# Patient Record
Sex: FEMALE | Race: WHITE | NOT HISPANIC OR LATINO | Employment: FULL TIME | URBAN - METROPOLITAN AREA
[De-identification: names, ages, dates, MRNs, and addresses within clinical notes are randomized per-mention and may not be internally consistent; named-entity substitution may affect disease eponyms.]

---

## 2017-01-05 ENCOUNTER — OFFICE VISIT - HEALTHEAST (OUTPATIENT)
Dept: FAMILY MEDICINE | Facility: CLINIC | Age: 57
End: 2017-01-05

## 2017-01-05 ENCOUNTER — COMMUNICATION - HEALTHEAST (OUTPATIENT)
Dept: TELEHEALTH | Facility: CLINIC | Age: 57
End: 2017-01-05

## 2017-01-05 DIAGNOSIS — J30.9 ALLERGIC RHINITIS: ICD-10-CM

## 2017-01-05 DIAGNOSIS — J01.90 ACUTE SINUSITIS, UNSPECIFIED: ICD-10-CM

## 2017-04-13 ENCOUNTER — OFFICE VISIT - HEALTHEAST (OUTPATIENT)
Dept: FAMILY MEDICINE | Facility: CLINIC | Age: 57
End: 2017-04-13

## 2017-04-13 DIAGNOSIS — R10.2 ADNEXAL PAIN: ICD-10-CM

## 2017-04-13 DIAGNOSIS — R10.9 ABDOMINAL PAIN: ICD-10-CM

## 2017-04-18 ENCOUNTER — COMMUNICATION - HEALTHEAST (OUTPATIENT)
Dept: FAMILY MEDICINE | Facility: CLINIC | Age: 57
End: 2017-04-18

## 2017-04-18 DIAGNOSIS — K03.2: ICD-10-CM

## 2017-06-16 ENCOUNTER — COMMUNICATION - HEALTHEAST (OUTPATIENT)
Dept: FAMILY MEDICINE | Facility: CLINIC | Age: 57
End: 2017-06-16

## 2018-08-01 ENCOUNTER — HOSPITAL ENCOUNTER (OUTPATIENT)
Dept: ULTRASOUND IMAGING | Facility: HOSPITAL | Age: 58
Discharge: HOME | End: 2018-08-01
Attending: FAMILY MEDICINE

## 2018-08-01 ENCOUNTER — OFFICE VISIT - HEALTHEAST (OUTPATIENT)
Dept: FAMILY MEDICINE | Facility: CLINIC | Age: 58
End: 2018-08-01

## 2018-08-01 ENCOUNTER — COMMUNICATION - HEALTHEAST (OUTPATIENT)
Dept: TELEHEALTH | Facility: CLINIC | Age: 58
End: 2018-08-01

## 2018-08-01 ENCOUNTER — RECORDS - HEALTHEAST (OUTPATIENT)
Dept: MAMMOGRAPHY | Facility: CLINIC | Age: 58
End: 2018-08-01

## 2018-08-01 DIAGNOSIS — S40.021S HEMATOMA OF ARM, RIGHT, SEQUELA: ICD-10-CM

## 2018-08-01 DIAGNOSIS — Z12.31 VISIT FOR SCREENING MAMMOGRAM: ICD-10-CM

## 2018-08-01 DIAGNOSIS — Z12.31 ENCOUNTER FOR SCREENING MAMMOGRAM FOR MALIGNANT NEOPLASM OF BREAST: ICD-10-CM

## 2018-10-26 ENCOUNTER — OFFICE VISIT - HEALTHEAST (OUTPATIENT)
Dept: FAMILY MEDICINE | Facility: CLINIC | Age: 58
End: 2018-10-26

## 2018-10-26 DIAGNOSIS — J00 ACUTE RHINITIS: ICD-10-CM

## 2018-10-26 DIAGNOSIS — Z00.00 HEALTHCARE MAINTENANCE: ICD-10-CM

## 2018-10-26 DIAGNOSIS — R20.0 NUMBNESS AND TINGLING: ICD-10-CM

## 2018-10-26 DIAGNOSIS — M79.10 MYALGIA: ICD-10-CM

## 2018-10-26 DIAGNOSIS — R20.2 NUMBNESS AND TINGLING: ICD-10-CM

## 2018-10-26 LAB
ALBUMIN UR-MCNC: NEGATIVE MG/DL
APPEARANCE UR: CLEAR
BILIRUB UR QL STRIP: NEGATIVE
COLOR UR AUTO: YELLOW
GLUCOSE UR STRIP-MCNC: NEGATIVE MG/DL
HGB UR QL STRIP: ABNORMAL
KETONES UR STRIP-MCNC: NEGATIVE MG/DL
LEUKOCYTE ESTERASE UR QL STRIP: NEGATIVE
NITRATE UR QL: NEGATIVE
PH UR STRIP: 5.5 [PH] (ref 5–8)
SP GR UR STRIP: 1.02 (ref 1–1.03)
UROBILINOGEN UR STRIP-ACNC: ABNORMAL

## 2019-01-25 ENCOUNTER — OFFICE VISIT - HEALTHEAST (OUTPATIENT)
Dept: FAMILY MEDICINE | Facility: CLINIC | Age: 59
End: 2019-01-25

## 2019-01-25 ENCOUNTER — COMMUNICATION - HEALTHEAST (OUTPATIENT)
Dept: SCHEDULING | Facility: CLINIC | Age: 59
End: 2019-01-25

## 2019-01-25 DIAGNOSIS — J30.89 NON-SEASONAL ALLERGIC RHINITIS DUE TO OTHER ALLERGIC TRIGGER: ICD-10-CM

## 2019-01-25 DIAGNOSIS — R42 VERTIGO: ICD-10-CM

## 2019-02-20 ENCOUNTER — OFFICE VISIT - HEALTHEAST (OUTPATIENT)
Dept: FAMILY MEDICINE | Facility: CLINIC | Age: 59
End: 2019-02-20

## 2019-02-20 ENCOUNTER — AMBULATORY - HEALTHEAST (OUTPATIENT)
Dept: ADMINISTRATIVE | Facility: CLINIC | Age: 59
End: 2019-02-20

## 2019-02-20 DIAGNOSIS — R42 DIZZINESS: ICD-10-CM

## 2019-02-20 DIAGNOSIS — Z00.00 ROUTINE GENERAL MEDICAL EXAMINATION AT A HEALTH CARE FACILITY: ICD-10-CM

## 2019-02-20 DIAGNOSIS — D50.9 IRON DEFICIENCY ANEMIA, UNSPECIFIED IRON DEFICIENCY ANEMIA TYPE: ICD-10-CM

## 2019-02-20 DIAGNOSIS — J30.89 NON-SEASONAL ALLERGIC RHINITIS DUE TO OTHER ALLERGIC TRIGGER: ICD-10-CM

## 2019-02-20 LAB
ALBUMIN SERPL-MCNC: 4.2 G/DL (ref 3.5–5)
ALP SERPL-CCNC: 101 U/L (ref 45–120)
ALT SERPL W P-5'-P-CCNC: 34 U/L (ref 0–45)
ANION GAP SERPL CALCULATED.3IONS-SCNC: 12 MMOL/L (ref 5–18)
AST SERPL W P-5'-P-CCNC: 27 U/L (ref 0–40)
BASOPHILS # BLD AUTO: 0.1 THOU/UL (ref 0–0.2)
BASOPHILS NFR BLD AUTO: 1 % (ref 0–2)
BILIRUB SERPL-MCNC: 1.3 MG/DL (ref 0–1)
BUN SERPL-MCNC: 12 MG/DL (ref 8–22)
CALCIUM SERPL-MCNC: 9.8 MG/DL (ref 8.5–10.5)
CHLORIDE BLD-SCNC: 103 MMOL/L (ref 98–107)
CHOLEST SERPL-MCNC: 238 MG/DL
CLUE CELLS: NORMAL
CO2 SERPL-SCNC: 26 MMOL/L (ref 22–31)
CREAT SERPL-MCNC: 1.06 MG/DL (ref 0.6–1.1)
EOSINOPHIL # BLD AUTO: 0.1 THOU/UL (ref 0–0.4)
EOSINOPHIL NFR BLD AUTO: 2 % (ref 0–6)
ERYTHROCYTE [DISTWIDTH] IN BLOOD BY AUTOMATED COUNT: 12.5 % (ref 11–14.5)
FASTING STATUS PATIENT QL REPORTED: YES
FERRITIN SERPL-MCNC: 72 NG/ML (ref 10–130)
GFR SERPL CREATININE-BSD FRML MDRD: 53 ML/MIN/1.73M2
GLUCOSE BLD-MCNC: 97 MG/DL (ref 70–125)
HCT VFR BLD AUTO: 43.1 % (ref 35–47)
HDLC SERPL-MCNC: 52 MG/DL
HGB BLD-MCNC: 14.4 G/DL (ref 12–16)
IRON SATN MFR SERPL: 30 % (ref 20–50)
IRON SERPL-MCNC: 100 UG/DL (ref 42–175)
LDLC SERPL CALC-MCNC: 154 MG/DL
LYMPHOCYTES # BLD AUTO: 1.2 THOU/UL (ref 0.8–4.4)
LYMPHOCYTES NFR BLD AUTO: 27 % (ref 20–40)
MCH RBC QN AUTO: 29.3 PG (ref 27–34)
MCHC RBC AUTO-ENTMCNC: 33.4 G/DL (ref 32–36)
MCV RBC AUTO: 88 FL (ref 80–100)
MONOCYTES # BLD AUTO: 0.4 THOU/UL (ref 0–0.9)
MONOCYTES NFR BLD AUTO: 8 % (ref 2–10)
NEUTROPHILS # BLD AUTO: 2.8 THOU/UL (ref 2–7.7)
NEUTROPHILS NFR BLD AUTO: 62 % (ref 50–70)
PLATELET # BLD AUTO: 267 THOU/UL (ref 140–440)
PMV BLD AUTO: 9.9 FL (ref 8.5–12.5)
POTASSIUM BLD-SCNC: 3.9 MMOL/L (ref 3.5–5)
PROT SERPL-MCNC: 7.9 G/DL (ref 6–8)
RBC # BLD AUTO: 4.92 MILL/UL (ref 3.8–5.4)
SODIUM SERPL-SCNC: 141 MMOL/L (ref 136–145)
TIBC SERPL-MCNC: 331 UG/DL (ref 313–563)
TRANSFERRIN SERPL-MCNC: 265 MG/DL (ref 212–360)
TRICHOMONAS, WET PREP: NORMAL
TRIGL SERPL-MCNC: 159 MG/DL
TSH SERPL DL<=0.005 MIU/L-ACNC: 2.13 UIU/ML (ref 0.3–5)
WBC: 4.6 THOU/UL (ref 4–11)
YEAST, WET PREP: NORMAL

## 2019-02-20 ASSESSMENT — MIFFLIN-ST. JEOR: SCORE: 1288.79

## 2019-02-21 LAB
HPV SOURCE: NORMAL
HUMAN PAPILLOMA VIRUS 16 DNA: NEGATIVE
HUMAN PAPILLOMA VIRUS 18 DNA: NEGATIVE
HUMAN PAPILLOMA VIRUS FINAL DIAGNOSIS: NORMAL
HUMAN PAPILLOMA VIRUS OTHER HR: NEGATIVE
SPECIMEN DESCRIPTION: NORMAL

## 2019-03-11 ENCOUNTER — COMMUNICATION - HEALTHEAST (OUTPATIENT)
Dept: FAMILY MEDICINE | Facility: CLINIC | Age: 59
End: 2019-03-11

## 2019-03-20 ENCOUNTER — RECORDS - HEALTHEAST (OUTPATIENT)
Dept: ADMINISTRATIVE | Facility: OTHER | Age: 59
End: 2019-03-20

## 2019-08-06 ENCOUNTER — COMMUNICATION - HEALTHEAST (OUTPATIENT)
Dept: FAMILY MEDICINE | Facility: CLINIC | Age: 59
End: 2019-08-06

## 2019-08-06 ENCOUNTER — HOSPITAL ENCOUNTER (OUTPATIENT)
Dept: MAMMOGRAPHY | Facility: CLINIC | Age: 59
Discharge: HOME | End: 2019-08-06
Attending: FAMILY MEDICINE

## 2019-08-06 DIAGNOSIS — Z12.31 VISIT FOR SCREENING MAMMOGRAM: ICD-10-CM

## 2019-10-28 ENCOUNTER — OFFICE VISIT - HEALTHEAST (OUTPATIENT)
Dept: FAMILY MEDICINE | Facility: CLINIC | Age: 59
End: 2019-10-28

## 2019-10-28 DIAGNOSIS — Z82.49 FAMILY HISTORY OF ABDOMINAL AORTIC ANEURYSM: ICD-10-CM

## 2019-10-28 DIAGNOSIS — Z23 NEED FOR INFLUENZA VACCINATION: ICD-10-CM

## 2019-10-28 ASSESSMENT — MIFFLIN-ST. JEOR: SCORE: 1287.26

## 2019-10-30 ENCOUNTER — HOSPITAL ENCOUNTER (OUTPATIENT)
Dept: ULTRASOUND IMAGING | Facility: HOSPITAL | Age: 59
Discharge: HOME | End: 2019-10-30
Attending: FAMILY MEDICINE

## 2019-10-30 ENCOUNTER — COMMUNICATION - HEALTHEAST (OUTPATIENT)
Dept: TELEHEALTH | Facility: CLINIC | Age: 59
End: 2019-10-30

## 2019-10-30 DIAGNOSIS — Z82.49 FAMILY HISTORY OF ABDOMINAL AORTIC ANEURYSM: ICD-10-CM

## 2019-11-01 ENCOUNTER — COMMUNICATION - HEALTHEAST (OUTPATIENT)
Dept: FAMILY MEDICINE | Facility: CLINIC | Age: 59
End: 2019-11-01

## 2019-11-01 ENCOUNTER — AMBULATORY - HEALTHEAST (OUTPATIENT)
Dept: FAMILY MEDICINE | Facility: CLINIC | Age: 59
End: 2019-11-01

## 2019-11-01 DIAGNOSIS — K80.20 CALCULUS OF GALLBLADDER WITHOUT CHOLECYSTITIS WITHOUT OBSTRUCTION: ICD-10-CM

## 2019-11-07 ENCOUNTER — SURGERY - HEALTHEAST (OUTPATIENT)
Dept: SURGERY | Facility: CLINIC | Age: 59
End: 2019-11-07

## 2019-11-07 ENCOUNTER — OFFICE VISIT - HEALTHEAST (OUTPATIENT)
Dept: SURGERY | Facility: CLINIC | Age: 59
End: 2019-11-07

## 2019-11-07 DIAGNOSIS — K80.20 CALCULUS OF GALLBLADDER WITHOUT CHOLECYSTITIS WITHOUT OBSTRUCTION: ICD-10-CM

## 2019-11-07 ASSESSMENT — MIFFLIN-ST. JEOR: SCORE: 1255.14

## 2019-11-13 ENCOUNTER — OFFICE VISIT - HEALTHEAST (OUTPATIENT)
Dept: FAMILY MEDICINE | Facility: CLINIC | Age: 59
End: 2019-11-13

## 2019-11-13 DIAGNOSIS — J01.01 ACUTE RECURRENT MAXILLARY SINUSITIS: ICD-10-CM

## 2019-11-13 ASSESSMENT — MIFFLIN-ST. JEOR: SCORE: 1273.28

## 2020-02-26 ENCOUNTER — COMMUNICATION - HEALTHEAST (OUTPATIENT)
Dept: FAMILY MEDICINE | Facility: CLINIC | Age: 60
End: 2020-02-26

## 2020-02-26 ENCOUNTER — OFFICE VISIT - HEALTHEAST (OUTPATIENT)
Dept: FAMILY MEDICINE | Facility: CLINIC | Age: 60
End: 2020-02-26

## 2020-02-26 DIAGNOSIS — Z11.4 SCREENING FOR HIV (HUMAN IMMUNODEFICIENCY VIRUS): ICD-10-CM

## 2020-02-26 DIAGNOSIS — Z00.00 ROUTINE GENERAL MEDICAL EXAMINATION AT A HEALTH CARE FACILITY: ICD-10-CM

## 2020-02-26 DIAGNOSIS — Z11.59 SCREENING FOR VIRAL DISEASE: ICD-10-CM

## 2020-02-26 DIAGNOSIS — Z23 IMMUNIZATION DUE: ICD-10-CM

## 2020-02-26 LAB
ALBUMIN SERPL-MCNC: 4.1 G/DL (ref 3.5–5)
ALP SERPL-CCNC: 107 U/L (ref 45–120)
ALT SERPL W P-5'-P-CCNC: 33 U/L (ref 0–45)
ANION GAP SERPL CALCULATED.3IONS-SCNC: 10 MMOL/L (ref 5–18)
AST SERPL W P-5'-P-CCNC: 27 U/L (ref 0–40)
BASOPHILS # BLD AUTO: 0 THOU/UL (ref 0–0.2)
BASOPHILS NFR BLD AUTO: 1 % (ref 0–2)
BILIRUB SERPL-MCNC: 1.5 MG/DL (ref 0–1)
BUN SERPL-MCNC: 14 MG/DL (ref 8–22)
CALCIUM SERPL-MCNC: 9.5 MG/DL (ref 8.5–10.5)
CHLORIDE BLD-SCNC: 105 MMOL/L (ref 98–107)
CHOLEST SERPL-MCNC: 245 MG/DL
CO2 SERPL-SCNC: 26 MMOL/L (ref 22–31)
CREAT SERPL-MCNC: 1.12 MG/DL (ref 0.6–1.1)
EOSINOPHIL # BLD AUTO: 0.2 THOU/UL (ref 0–0.4)
EOSINOPHIL NFR BLD AUTO: 5 % (ref 0–6)
ERYTHROCYTE [DISTWIDTH] IN BLOOD BY AUTOMATED COUNT: 12.3 % (ref 11–14.5)
FASTING STATUS PATIENT QL REPORTED: YES
GFR SERPL CREATININE-BSD FRML MDRD: 50 ML/MIN/1.73M2
GLUCOSE BLD-MCNC: 79 MG/DL (ref 70–125)
HCT VFR BLD AUTO: 41.2 % (ref 35–47)
HDLC SERPL-MCNC: 55 MG/DL
HGB BLD-MCNC: 13.8 G/DL (ref 12–16)
HIV 1+2 AB+HIV1 P24 AG SERPL QL IA: NEGATIVE
LDLC SERPL CALC-MCNC: 166 MG/DL
LYMPHOCYTES # BLD AUTO: 1.3 THOU/UL (ref 0.8–4.4)
LYMPHOCYTES NFR BLD AUTO: 34 % (ref 20–40)
MCH RBC QN AUTO: 29.8 PG (ref 27–34)
MCHC RBC AUTO-ENTMCNC: 33.4 G/DL (ref 32–36)
MCV RBC AUTO: 89 FL (ref 80–100)
MONOCYTES # BLD AUTO: 0.4 THOU/UL (ref 0–0.9)
MONOCYTES NFR BLD AUTO: 10 % (ref 2–10)
NEUTROPHILS # BLD AUTO: 2 THOU/UL (ref 2–7.7)
NEUTROPHILS NFR BLD AUTO: 51 % (ref 50–70)
PLATELET # BLD AUTO: 232 THOU/UL (ref 140–440)
PMV BLD AUTO: 7.2 FL (ref 7–10)
POTASSIUM BLD-SCNC: 4.2 MMOL/L (ref 3.5–5)
PROT SERPL-MCNC: 7.6 G/DL (ref 6–8)
RBC # BLD AUTO: 4.63 MILL/UL (ref 3.8–5.4)
SODIUM SERPL-SCNC: 141 MMOL/L (ref 136–145)
TRIGL SERPL-MCNC: 120 MG/DL
WBC: 3.9 THOU/UL (ref 4–11)

## 2020-02-26 ASSESSMENT — MIFFLIN-ST. JEOR: SCORE: 1286.64

## 2020-02-27 LAB — HCV AB SERPL QL IA: NEGATIVE

## 2020-03-02 ENCOUNTER — COMMUNICATION - HEALTHEAST (OUTPATIENT)
Dept: FAMILY MEDICINE | Facility: CLINIC | Age: 60
End: 2020-03-02

## 2020-04-16 ENCOUNTER — COMMUNICATION - HEALTHEAST (OUTPATIENT)
Dept: FAMILY MEDICINE | Facility: CLINIC | Age: 60
End: 2020-04-16

## 2020-04-16 DIAGNOSIS — J30.89 NON-SEASONAL ALLERGIC RHINITIS DUE TO OTHER ALLERGIC TRIGGER: ICD-10-CM

## 2020-07-23 ENCOUNTER — OFFICE VISIT - HEALTHEAST (OUTPATIENT)
Dept: FAMILY MEDICINE | Facility: CLINIC | Age: 60
End: 2020-07-23

## 2020-07-23 ENCOUNTER — COMMUNICATION - HEALTHEAST (OUTPATIENT)
Dept: SCHEDULING | Facility: CLINIC | Age: 60
End: 2020-07-23

## 2020-07-23 DIAGNOSIS — J01.01 ACUTE RECURRENT MAXILLARY SINUSITIS: ICD-10-CM

## 2020-07-23 DIAGNOSIS — M65.30 SNAPPING FINGER: ICD-10-CM

## 2020-09-22 ENCOUNTER — HOSPITAL ENCOUNTER (OUTPATIENT)
Dept: MAMMOGRAPHY | Facility: CLINIC | Age: 60
Discharge: HOME | End: 2020-09-22
Attending: FAMILY MEDICINE

## 2020-09-22 DIAGNOSIS — Z12.31 VISIT FOR SCREENING MAMMOGRAM: ICD-10-CM

## 2020-09-25 ENCOUNTER — COMMUNICATION - HEALTHEAST (OUTPATIENT)
Dept: FAMILY MEDICINE | Facility: CLINIC | Age: 60
End: 2020-09-25

## 2020-09-29 ENCOUNTER — COMMUNICATION - HEALTHEAST (OUTPATIENT)
Dept: FAMILY MEDICINE | Facility: CLINIC | Age: 60
End: 2020-09-29

## 2020-09-29 ENCOUNTER — COMMUNICATION - HEALTHEAST (OUTPATIENT)
Dept: SCHEDULING | Facility: CLINIC | Age: 60
End: 2020-09-29

## 2020-09-29 DIAGNOSIS — R79.89 ABNORMAL SERUM CREATININE LEVEL: ICD-10-CM

## 2020-10-10 ENCOUNTER — VIRTUAL VISIT (OUTPATIENT)
Dept: FAMILY MEDICINE | Facility: OTHER | Age: 60
End: 2020-10-10

## 2020-10-10 ENCOUNTER — COMMUNICATION - HEALTHEAST (OUTPATIENT)
Dept: SCHEDULING | Facility: CLINIC | Age: 60
End: 2020-10-10

## 2020-10-11 NOTE — PROGRESS NOTES
"Date: 10/10/2020 20:00:38  Clinician: Brittany Contreras  Clinician NPI: [id number]  Patient: Cristiana Shi  Patient : 1960  Patient Address: [address] [place] [address]  Patient Phone: [phone]  Visit Protocol: URI  Patient Summary:  Cristiana is a 60 year old ( : 1960 ) female who initiated a [facility] Visit for COVID-19 (Coronavirus) evaluation and screening. When asked the question \"Please sign me up to receive news, health information and promotions. \", Cristiana responded \"No\".    When asked when her symptoms started, Cristiana reported that she does not have any symptoms.   She denies taking antibiotic medication in the past month and having recent facial or sinus surgery in the past 60 days.    Pertinent COVID-19 (Coronavirus) information  In the past 14 days, Cristiana has not worked in a congregate living setting.   She does not work or volunteer as healthcare worker or a  and does not work or volunteer in a healthcare facility.   Cristiana also has not lived in a congregate living setting in the past 14 days. She does not live with a healthcare worker.   Cristiana has had a close contact with a laboratory-confirmed COVID-19 patient in the last 14 days. Additional information about contact with COVID-19 (Coronavirus) patient as reported by the patient (free text): Dakota Paul 10/09/2020 in his driveway while we were loading a car. We were less than 6 feet apart not wearing masks.   Patient reported they are not living in the same household with a COVID-19 positive patient.  Patient denies being in an enclosed space for greater than 15 minutes with a COVID-19 patient.  Since 2019, Cristiana and has not had upper respiratory infection or influenza-like illness. Has not been diagnosed with lab-confirmed COVID-19 test   Pertinent medical history  Cristiana does not get yeast infections when she takes antibiotics.   Cristiana does not need a return to work/school note.   Weight: 163 lbs   " Cristiana does not smoke or use smokeless tobacco.   Weight: 163 lbs    MEDICATIONS: loratadine oral, ALLERGIES: NKDA  Clinician Response:  Dear Cristiana,   Based on your exposure to COVID-19 (coronavirus), we would like to test you for this virus.  1. Please call [phone] to schedule your visit. Explain that you were referred by [facility] to have a COVID-19 test. Be ready to share your [facility] visit ID number.  The following will serve as your written order for this COVID Test, ordered by me, for the indication of suspected COVID [Z20.828]: The test will be ordered in [facility], our electronic health record, after you are scheduled. It will show as ordered and authorized by Cedrick Campbell MD.  Order: COVID-19 (coronavirus) PCR for ASYMPTOMATIC EXPOSURE testing from [facility].  If you know you have had close contact with someone who tested positive, you should be quarantined for 14 days after this exposure. You should stay in quarantine for the14 days even if the covid test is negative, the optimal time to test after exposure is 5-7 days from the exposure  Quarantine means   What should I do?  For safety, it's very important to follow these rules. Do this for 14 days after the date you were last exposed to the virus..  Stay home and away from others. Don't go to school or anywhere else. Generally quarantine means staying home from work but there are some exceptions to this. Please contact your workplace.   No hugging, kissing or shaking hands.  Don't let anyone visit.  Cover your mouth and nose with a mask, tissue or washcloth to avoid spreading germs.  Wash your hands and face often. Use soap and water.  What are the symptoms of COVID-19?  The most common symptoms are cough, fever and trouble breathing. Less common symptoms include headache, body aches, fatigue (feeling very tired), chills, sore throat, stuffy or runny nose, diarrhea (loose poop), loss of taste or smell, belly pain, and nausea or vomiting (feeling sick to your  stomach or throwing up).  After 14 days, if you have still don't have symptoms, you likely don't have this virus.  If you develop symptoms, follow these guidelines.  If you're normally healthy: Please start another [facility] visit to report your symptoms. Go to [email or web address].  If you have a serious health problem (like cancer, heart failure, an organ transplant or kidney disease): Call your specialty clinic. Let them know that you might have COVID-19.  2. When it's time for your COVID test:  Stay at least 6 feet away from others. (If someone will drive you to your test, stay in the backseat, as far away from the  as you can.)  Cover your mouth and nose with a mask, tissue or washcloth.  Go straight to the testing site. Don't make any stops on the way there or back.  Please note  Caregivers in these groups are at risk for severe illness due to COVID-19:  o People 65 years and older  o People who live in a nursing home or long-term care facility  o People with chronic disease (lung, heart, cancer, diabetes, kidney, liver, immunologic)  o People who have a weakened immune system, including those who:  Are in cancer treatment  Take medicine that weakens the immune system, such as corticosteroids  Had a bone marrow or organ transplant  Have an immune deficiency  Have poorly controlled HIV or AIDS  Are obese (body mass index of 40 or higher)  Smoke regularly  Where can I get more information?  [facility] -- About COVID-19: [email or web address]  CDC -- What to Do If You're Sick: www.cdc.gov/coronavirus/2019-ncov/about/steps-when-sick.html  CDC -- Ending Home Isolation: www.cdc.gov/coronavirus/2019-ncov/hcp/disposition-in-home-patients.html  CDC -- Caring for Someone: www.cdc.gov/coronavirus/2019-ncov/if-you-are-sick/care-for-someone.html  [facility] -- Interim Guidance for Hospital Discharge to Home: www.health.[facility].[place].us/diseases/coronavirus/hcp/hospdischarge.pdf  [facility] clinical trials  (COVID-19 research studies): clinicalaffairs.[facility]-clinical-trials  Below are the COVID-19 hotlines at the [place] Department of Health ([facility]). Interpreters are available.  For health questions: Call 128-218-5313 or 1-790.985.8470 (7 a.m. to 7 p.m.)  For questions about schools and childcare: Call 393-123-3223 or 1-173.716.4709 (7 a.m. to 7 p.m.)    COVID-19 (Coronavirus) General Information  Because there is currently no vaccine to prevent infection, the best way to protect yourself is to avoid being exposed to this virus. Common symptoms of COVID-19 include but are not limited to fever, cough, and shortness of breath. These symptoms appear 2-14 days after you are exposed to the virus that causes COVID-19. Click here for more information from the CDC on how to protect yourself.  If you are sick with COVID-19 or suspect you are infected with the virus that causes COVID-19, follow the steps here from the CDC to help prevent the disease from spreading to people in your home and community.  Click here for general information from the CDC on testing.  If you develop any of these emergency warning signs for COVID-19, get medical attention immediately:     Trouble breathing    Persistent pain or pressure in the chest    New confusion or inability to arouse    Bluish lips or face      Call your doctor or clinic before going in. Call [phone] if you have a medical emergency and notify the  you have or think you may have COVID-19.  For more detailed and up to date information on COVID-19 (Coronavirus), please visit the CDC website.   Diagnosis: Contact with and (suspected) exposure to other viral communicable diseases  Diagnosis ICD: Z20.828

## 2020-10-13 ENCOUNTER — AMBULATORY - HEALTHEAST (OUTPATIENT)
Dept: FAMILY MEDICINE | Facility: CLINIC | Age: 60
End: 2020-10-13

## 2020-10-13 DIAGNOSIS — Z20.822 SUSPECTED 2019 NOVEL CORONAVIRUS INFECTION: ICD-10-CM

## 2020-10-14 ENCOUNTER — AMBULATORY - HEALTHEAST (OUTPATIENT)
Dept: FAMILY MEDICINE | Facility: CLINIC | Age: 60
End: 2020-10-14

## 2020-10-14 DIAGNOSIS — Z20.822 SUSPECTED 2019 NOVEL CORONAVIRUS INFECTION: ICD-10-CM

## 2020-10-16 ENCOUNTER — COMMUNICATION - HEALTHEAST (OUTPATIENT)
Dept: SCHEDULING | Facility: CLINIC | Age: 60
End: 2020-10-16

## 2021-05-13 ENCOUNTER — RECORDS - HEALTHEAST (OUTPATIENT)
Dept: ADMINISTRATIVE | Facility: OTHER | Age: 61
End: 2021-05-13

## 2021-05-14 ENCOUNTER — RECORDS - HEALTHEAST (OUTPATIENT)
Dept: ADMINISTRATIVE | Facility: OTHER | Age: 61
End: 2021-05-14

## 2021-05-14 ENCOUNTER — OFFICE VISIT - HEALTHEAST (OUTPATIENT)
Dept: FAMILY MEDICINE | Facility: CLINIC | Age: 61
End: 2021-05-14

## 2021-05-14 DIAGNOSIS — Z00.00 ROUTINE GENERAL MEDICAL EXAMINATION AT A HEALTH CARE FACILITY: ICD-10-CM

## 2021-05-14 DIAGNOSIS — N18.31 STAGE 3A CHRONIC KIDNEY DISEASE (H): ICD-10-CM

## 2021-05-14 DIAGNOSIS — R23.3 EASY BRUISING: ICD-10-CM

## 2021-05-14 DIAGNOSIS — E55.9 VITAMIN D DEFICIENCY: ICD-10-CM

## 2021-05-14 DIAGNOSIS — J30.89 NON-SEASONAL ALLERGIC RHINITIS DUE TO OTHER ALLERGIC TRIGGER: ICD-10-CM

## 2021-05-14 LAB
ALBUMIN SERPL-MCNC: 4.2 G/DL (ref 3.5–5)
ALP SERPL-CCNC: 103 U/L (ref 45–120)
ALT SERPL W P-5'-P-CCNC: 36 U/L (ref 0–45)
ANION GAP SERPL CALCULATED.3IONS-SCNC: 11 MMOL/L (ref 5–18)
AST SERPL W P-5'-P-CCNC: 23 U/L (ref 0–40)
BILIRUB SERPL-MCNC: 1.4 MG/DL (ref 0–1)
BUN SERPL-MCNC: 15 MG/DL (ref 8–22)
CALCIUM SERPL-MCNC: 9.3 MG/DL (ref 8.5–10.5)
CHLORIDE BLD-SCNC: 104 MMOL/L (ref 98–107)
CHOLEST SERPL-MCNC: 232 MG/DL
CO2 SERPL-SCNC: 28 MMOL/L (ref 22–31)
CREAT SERPL-MCNC: 1.16 MG/DL (ref 0.6–1.1)
ERYTHROCYTE [DISTWIDTH] IN BLOOD BY AUTOMATED COUNT: 12.4 % (ref 11–14.5)
FASTING STATUS PATIENT QL REPORTED: NO
GFR SERPL CREATININE-BSD FRML MDRD: 47 ML/MIN/1.73M2
GLUCOSE BLD-MCNC: 89 MG/DL (ref 70–125)
HCT VFR BLD AUTO: 43.8 % (ref 35–47)
HDLC SERPL-MCNC: 49 MG/DL
HGB BLD-MCNC: 14.7 G/DL (ref 12–16)
LDLC SERPL CALC-MCNC: 154 MG/DL
MCH RBC QN AUTO: 29.9 PG (ref 27–34)
MCHC RBC AUTO-ENTMCNC: 33.6 G/DL (ref 32–36)
MCV RBC AUTO: 89 FL (ref 80–100)
PLATELET # BLD AUTO: 230 THOU/UL (ref 140–440)
PMV BLD AUTO: 8.7 FL (ref 7–10)
POTASSIUM BLD-SCNC: 4.4 MMOL/L (ref 3.5–5)
PROT SERPL-MCNC: 7.7 G/DL (ref 6–8)
RBC # BLD AUTO: 4.92 MILL/UL (ref 3.8–5.4)
SODIUM SERPL-SCNC: 143 MMOL/L (ref 136–145)
TRIGL SERPL-MCNC: 144 MG/DL
TSH SERPL DL<=0.005 MIU/L-ACNC: 1.04 UIU/ML (ref 0.3–5)
WBC: 5.1 THOU/UL (ref 4–11)

## 2021-05-14 RX ORDER — LORATADINE 10 MG/1
10 TABLET ORAL DAILY
Qty: 90 TABLET | Refills: 3 | Status: SHIPPED | OUTPATIENT
Start: 2021-05-14 | End: 2021-09-23

## 2021-05-14 RX ORDER — MOMETASONE FUROATE MONOHYDRATE 50 UG/1
2 SPRAY, METERED NASAL DAILY
Qty: 51 G | Refills: 4 | Status: SHIPPED | OUTPATIENT
Start: 2021-05-14 | End: 2022-09-28

## 2021-05-14 RX ORDER — PSEUDOEPHEDRINE HCL 30 MG
30 TABLET ORAL EVERY 6 HOURS PRN
Qty: 96 TABLET | Refills: 1 | Status: SHIPPED | OUTPATIENT
Start: 2021-05-14 | End: 2022-09-28

## 2021-05-14 ASSESSMENT — MIFFLIN-ST. JEOR: SCORE: 1245.82

## 2021-05-17 LAB — 25(OH)D3 SERPL-MCNC: 37.4 NG/ML (ref 30–80)

## 2021-05-19 ENCOUNTER — COMMUNICATION - HEALTHEAST (OUTPATIENT)
Dept: FAMILY MEDICINE | Facility: CLINIC | Age: 61
End: 2021-05-19

## 2021-05-21 ENCOUNTER — AMBULATORY - HEALTHEAST (OUTPATIENT)
Dept: FAMILY MEDICINE | Facility: CLINIC | Age: 61
End: 2021-05-21

## 2021-05-21 DIAGNOSIS — N18.31 STAGE 3A CHRONIC KIDNEY DISEASE (H): ICD-10-CM

## 2021-05-25 ENCOUNTER — RECORDS - HEALTHEAST (OUTPATIENT)
Dept: ADMINISTRATIVE | Facility: CLINIC | Age: 61
End: 2021-05-25

## 2021-05-27 ENCOUNTER — RECORDS - HEALTHEAST (OUTPATIENT)
Dept: ADMINISTRATIVE | Facility: CLINIC | Age: 61
End: 2021-05-27

## 2021-05-27 VITALS — RESPIRATION RATE: 16 BRPM | DIASTOLIC BLOOD PRESSURE: 78 MMHG | SYSTOLIC BLOOD PRESSURE: 115 MMHG | HEART RATE: 69 BPM

## 2021-05-27 VITALS — HEIGHT: 61 IN | WEIGHT: 165 LBS | BODY MASS INDEX: 31.18 KG/M2

## 2021-05-28 ENCOUNTER — RECORDS - HEALTHEAST (OUTPATIENT)
Dept: ADMINISTRATIVE | Facility: CLINIC | Age: 61
End: 2021-05-28

## 2021-05-30 VITALS — BODY MASS INDEX: 30.86 KG/M2 | WEIGHT: 166 LBS

## 2021-05-30 VITALS — WEIGHT: 169 LBS | BODY MASS INDEX: 31.42 KG/M2

## 2021-06-01 VITALS — BODY MASS INDEX: 31.6 KG/M2 | WEIGHT: 170 LBS

## 2021-06-02 ENCOUNTER — RECORDS - HEALTHEAST (OUTPATIENT)
Dept: ADMINISTRATIVE | Facility: CLINIC | Age: 61
End: 2021-06-02

## 2021-06-02 VITALS — WEIGHT: 175 LBS | BODY MASS INDEX: 32.53 KG/M2

## 2021-06-02 VITALS — HEIGHT: 61 IN | WEIGHT: 172.5 LBS | BODY MASS INDEX: 32.57 KG/M2

## 2021-06-02 VITALS — BODY MASS INDEX: 31.69 KG/M2 | WEIGHT: 170.5 LBS

## 2021-06-02 NOTE — PROGRESS NOTES
ASSESSMENT/PLAN:  1. Family history of abdominal aortic aneurysm  US Abdominal Aorta   2. Need for influenza vaccination  Influenza, Recombinant, Inj, Quadrivalent, PF, 18+YRS       This is a 60 yo female here for:  1.  Discussion regarding family history of abdominal aortic aneurysm:  Mom was hospitalized a year ago for surgical repair of leaking AAA.  Family members were instructed to be screened as well.  Patient's identical twin has been screened and is negative.  Patient is just finding out about this recommendation and is nervous about this.  We discussed this - I provided her with information about AAA.  Will get ultrasound of abdominal aorta - discuss actual risk once we have results.    2.  Due for seasonal influenza vaccination - ordered today.   Return in about 4 months (around 2/28/2020) for Annual physical.      There are no discontinued medications.  Patient Instructions       Chief Complaint:  Chief Complaint   Patient presents with     discuss family history of aortic aneurysm       HPI:   Cristiana Padilla is a 59 y.o. female c/o  Mom had leaking aortic aneurysm a year ago - had surgery and is okay  Children were told to get checked for aneurysm as well      PMH:   Patient Active Problem List    Diagnosis Date Noted     Calculus of gallbladder without cholecystitis without obstruction 11/01/2019     Iron deficiency anemia 02/20/2019     Obesity (BMI 30.0-34.9) 01/25/2019     Hyperplastic colon polyp 06/01/2016     Vertigo 12/07/2015     Past Medical History:   Diagnosis Date     Acute Sinusitis     Created by Conversion      Bruxism     Created by Conversion      Generalized Teeth Erosion     Created by Conversion      Menopause age 53     Tingling (Paresthesia)     Created by Conversion      Past Surgical History:   Procedure Laterality Date     WISDOM TOOTH EXTRACTION       Social History     Socioeconomic History     Marital status:      Spouse name: Not on file     Number of children: Not on  file     Years of education: Not on file     Highest education level: Not on file   Occupational History     Not on file   Social Needs     Financial resource strain: Not on file     Food insecurity:     Worry: Not on file     Inability: Not on file     Transportation needs:     Medical: Not on file     Non-medical: Not on file   Tobacco Use     Smoking status: Never Smoker     Smokeless tobacco: Never Used   Substance and Sexual Activity     Alcohol use: No     Drug use: No     Sexual activity: Yes     Partners: Male     Birth control/protection: Post-menopausal   Lifestyle     Physical activity:     Days per week: Not on file     Minutes per session: Not on file     Stress: Not on file   Relationships     Social connections:     Talks on phone: Not on file     Gets together: Not on file     Attends [employer] service: Not on file     Active member of club or organization: Not on file     Attends meetings of clubs or organizations: Not on file     Relationship status: Not on file     Intimate partner violence:     Fear of current or ex partner: Not on file     Emotionally abused: Not on file     Physically abused: Not on file     Forced sexual activity: Not on file   Other Topics Concern     Not on file   Social History Narrative    ; lives with      Family History   Problem Relation Age of Onset     Clotting disorder Father         had Waldenstroms macroglobulinemia     Colon cancer Mother 62     Aortic aneurysm Mother      Liver disease Sister 56     Alcohol abuse Maternal Grandfather        Meds:    Current Outpatient Medications:      loratadine (CLARITIN) 10 mg tablet, Take 1 tablet (10 mg total) by mouth daily., Disp: 90 tablet, Rfl: 4     mometasone (NASONEX) 50 mcg/actuation nasal spray, 2 sprays into each nostril daily., Disp: 51 g, Rfl: 4     pseudoephedrine (SUDAFED) 30 MG tablet, Take 1 tablet (30 mg total) by mouth every 6 (six) hours as needed for congestion., Disp: 96 tablet, Rfl:  "4    Allergies:  No Known Allergies    ROS:  Pertinent positives as noted in HPI; otherwise 12 point ROS negative.      Physical Exam:  EXAM:  /84 (Patient Site: Left Arm, Patient Position: Sitting, Cuff Size: Adult Large)   Pulse 70   Resp 16   Ht 5' 0.13\" (1.527 m)   Wt 176 lb 1.6 oz (79.9 kg)   BMI 34.25 kg/m     Gen:  NAD, appears well, well-hydrated  HEENT:  TMs nl, oropharynx benign, nasal mucosa nl, conjunctiva clear  Neck:  Supple, no adenopathy, no thyromegaly, no carotid bruits, no JVD  Lungs:  Clear to auscultation bilaterally  Cor:  RRR no murmur  Abd:  Soft, nontender, BS+, no masses (no pulsatile masses), no guarding or rebound, no HSM  Extr:  Neg.  Neuro:  No asymmetry  Skin:  Warm/dry        Results:    Us Abdominal Aorta    Result Date: 10/30/2019  EXAM: US ABDOMINAL AORTA LOCATION: [facility] DATE/TIME: 10/30/2019 8:16 AM INDICATION: family history rupture abdominal aortic aneurysm COMPARISON: None. TECHNIQUE: Transverse and longitudinal images of the aorta. Color flow and spectral Doppler with waveform analysis. FINDINGS: No abdominal aortic aneurysm.  There is very mild atheromatous plaque in the abdominal aorta. Incidental note of cholelithiasis. MEASUREMENTS: Proximal Aorta: 2.5 x 2.5 cm. Mid Aorta: 1.8 x 1.7 cm. Distal Aorta: 1.8 x 1.8 cm. Right Common Iliac Artery: 1.2 x 1.2 cm. Left Common Iliac Artery: 1.1 x 1.1 cm.     1.  No abdominal aortic aneurysm. 2.  Cholelithiasis.     "

## 2021-06-03 VITALS
WEIGHT: 169 LBS | RESPIRATION RATE: 16 BRPM | BODY MASS INDEX: 33.18 KG/M2 | HEIGHT: 60 IN | SYSTOLIC BLOOD PRESSURE: 118 MMHG | DIASTOLIC BLOOD PRESSURE: 84 MMHG | HEART RATE: 74 BPM | OXYGEN SATURATION: 98 %

## 2021-06-03 VITALS
BODY MASS INDEX: 34.57 KG/M2 | HEART RATE: 70 BPM | WEIGHT: 176.1 LBS | SYSTOLIC BLOOD PRESSURE: 118 MMHG | DIASTOLIC BLOOD PRESSURE: 84 MMHG | RESPIRATION RATE: 16 BRPM | HEIGHT: 60 IN

## 2021-06-03 NOTE — PROGRESS NOTES
ASSESSMENT/PLAN:    1. Acute recurrent maxillary sinusitis  We discussed that she may just have a viral upper respiratory infection on top of chronic allergic rhinitis, or could be sinus infection.  Onset is a bit vague to make a firm diagnosis.  I did write a prescription for azithromycin which she could take in a few days if her symptoms continue to be severe or increase in severity.  Otherwise, I would continue symptomatic care.  - azithromycin (ZITHROMAX Z-ELISA) 250 MG tablet; Take 2 tablets (500 mg) on  Day 1,  followed by 1 tablet (250 mg) once daily on Days 2 through 5.  Dispense: 6 tablet; Refill: 0       Return in about 3 months (around 2/13/2020) for Wellness Visit/Healthcare Maintainance Visit, with your primary care doctor.     SUBJECTIVE:  Cristiana Padilla is a 59 y.o. female here for sinus and throat congestion.  She usually goes to [facility], but is seen today at [facility].    She is a bit vague about her symptoms.  She always has some sinus congestion and takes Claritin, Sudafed, and Nasonex nasal spray for this.  This is been worse in the last 4 to 5 days, however.  Had a lot of pressure especially in her left maxillary sinus area about 4 days ago.  Additionally, she is had a sensation of something stuck in her throat that needs to be clear to make some sound when she is on the treadmill, got much worse in the last 3 days or so.    No fevers or chills.  Not a lot of sinus drainage.  Does have quite a bit of cough that is mostly nonproductive.  She wonders about the possibility of walking pneumonia.    She is a lifelong non-smoker.  No asthma or chronic respiratory issues.    ROS:  Remainder review of systems is negative unless previously stated    PHQ-2 Total Score: 0 (2/20/2019  8:00 AM)  PHQ-9 Total Score: 4 (2/20/2019  8:00 AM)       The following portions of the patient's history were reviewed and updated as appropriate: allergies, current medications and  "problem list  Current Outpatient Medications on File Prior to Visit   Medication Sig     loratadine (CLARITIN) 10 mg tablet Take 1 tablet (10 mg total) by mouth daily.     mometasone (NASONEX) 50 mcg/actuation nasal spray 2 sprays into each nostril daily.     pseudoephedrine (SUDAFED) 30 MG tablet Take 1 tablet (30 mg total) by mouth every 6 (six) hours as needed for congestion.     No current facility-administered medications on file prior to visit.         Social History     Tobacco Use   Smoking Status Never Smoker   Smokeless Tobacco Never Used       OBJECTIVE:  :  /86   Pulse 79   Temp 98.5  F (36.9  C) (Oral)   Resp 20   Ht 5' 0.13\" (1.527 m)   Wt 173 lb (78.5 kg)   SpO2 95%   BMI 33.64 kg/m    Wt Readings from Last 3 Encounters:   11/13/19 173 lb (78.5 kg)   11/07/19 169 lb (76.7 kg)   10/28/19 176 lb 1.6 oz (79.9 kg)         Gen:  A&A, NAD  HEENT: Bilateral ear canals clear, pearly gray tympanic membranes.  Oropharynx pink moist and benign.  Left nasal turbinate is moderately edematous and erythematous, right is slightly edematous.  Neck:  supple, no sig LAD or thyromegally  CV:  HRRR, no M/R/G  Resp:  CTAB  Ext:  W&D, no edema    "

## 2021-06-03 NOTE — PATIENT INSTRUCTIONS - HE
Patient Education     Understanding Acute Rhinosinusitis    Acute rhinosinusitis is when the lining of the inside of the nose and the sinuses becomes irritated and swollen. It is also called sinusitis, or a sinus infection.  Sinuses are air-filled spaces in the skull behind the face. They are kept moist and clean by a lining of mucosa. Things such as pollen, smoke, and chemical fumes can irritate the mucosa. It can then swell up. As a response to irritation, the mucosa makes more mucus and other fluids. Tiny hairlike cilia cover the mucosa. Cilia help carry mucus toward the opening of the sinus. Too much mucus may cause the cilia to stop working. This blocks the sinus opening. A buildup of fluid in the sinuses then causes pain and pressure. It can also cause bacteria to grow in the sinuses.  What causes acute rhinosinusitis?  A sinus infection is most often caused by a virus. You are more likely to get one after having a cold or the flu. In some cases, a sinus infection can be caused by bacteria.  You are at higher risk for a sinus infection if you:    Are older in age    Have structural problems with your sinuses    Smoke or are exposed to secondhand smoke    Are exposed to changes in pressure, such as from flying a lot or deep sea diving    Have asthma or allergies    Have a weak immune system    Have dental disease     Symptoms of acute rhinosinusitis  Symptoms of acute rhinosinusitis often last around 7 to 10 days. If you have a bacterial infection, they may last longer. They may also get better but then worsen. You may have:    Face pain or pressure under the eyes and around the nose    Headache    Fluid draining in the back of the throat (postnasal drip)    Congestion    Drainage that is thick and colored (often green), instead of clear    Cough    Problems with your sense of smell    Ear pain or hearing problems    Fever    Tooth pain    Fatigue  Diagnosing acute rhinosinusitis  Your healthcare provider  will ask about your symptoms and past health. He or she will look at your ears, nose, throat, and sinuses. Imaging tests, such as X-rays, are often not needed.  It can be hard to figure out if a sinus infection is caused by a virus or bacterium. A bacterial infection tends to last longer. Symptoms may also get better but then worsen. Your healthcare provider may take a sample of mucus from your nose to check for bacteria.  Treating acute rhinosinusitis  Most sinus infections will go away within 10 days. Your body will fight off the virus. If your symptoms seem to get better but then worsen, you may have a bacterial infection instead. Your healthcare provider will then give you antibiotics. Take this medicine until it is gone, even if you feel better.  To help ease your symptoms, your healthcare provider may advise:    Over-the-counter pain relievers. Medicines such as acetaminophen or ibuprofen can ease sinus pain. They may also lower a fever.    Nasal washes. Washing your nasal passages with salt water may ease pain and pressure. It can rinse out mucous and other irritants from your sinuses. Your healthcare provider can show you how to do it.    Nasal steroid spray. This prescription medicine can reduce inflammation in your sinuses.    Other medicines. Decongestants, antihistamines, and other nasal sprays may give short-term relief. They may help with congestion. Talk with your healthcare provider before taking these medicines.     Preventing acute rhinosinusitis  You can help prevent a sinus infection with these steps:    Wash your hands well and often.    Stay away from people who have a cold or upper respiratory infection.    Don't smoke. And stay away from secondhand smoke.    Use a humidifier at home.    Make sure you are up-to-date on your vaccines, such as the flu shot.     When to call your healthcare provider  Call your healthcare provider right away if you have any of these:    Fever of 100.4 F (38 C) or  higher, or as directed by your healthcare provider    Pain that gets worse    Symptoms that don t get better, or get worse    New symptoms  Date Last Reviewed: 6/1/2019 2000-2019 [employer] [address] [place] [address], [place] [address]. All rights reserved. This information is not intended as a substitute for professional medical care. Always follow your healthcare professional's instructions.

## 2021-06-03 NOTE — PROGRESS NOTES
HPI:   Cristiana Padilla is a 59 y.o. female referred to see me for cholelithiasis.  This was found incidentally when she was having and ultrasound of her aorta looking for an aneurysm due to family history.  When asked about abdominal pains, she states that she had 2 episodes back in June of sharp pain within the mid abdomen.  It seemed to radiate across the entire abdomen.  The pain was described as tight and it lasted about 20 minutes.  She does not recall any specific trigger.  She denies having any other symptoms along with it such as gassiness, bloating, nausea, or vomiting.  She does have some chronic underlying right subscapular pain.  Otherwise, she appears to be quite asymptomatic.  She denies any episodes of jaundice or acholic stools.      Allergies:  Patient has no known allergies.    Past medical history:  Sinusitis    Past surgical history:  [place] tooth extraction    Current Outpatient Medications:      loratadine (CLARITIN) 10 mg tablet, Take 1 tablet (10 mg total) by mouth daily., Disp: 90 tablet, Rfl: 4     mometasone (NASONEX) 50 mcg/actuation nasal spray, 2 sprays into each nostril daily., Disp: 51 g, Rfl: 4     pseudoephedrine (SUDAFED) 30 MG tablet, Take 1 tablet (30 mg total) by mouth every 6 (six) hours as needed for congestion., Disp: 96 tablet, Rfl: 4    Family history:  Father had a Waldenstrom's macroglobulinemia  Mother had colon cancer and abdominal aortic aneurysm    Social history:  Reports that she has never smoked. She has never used smokeless tobacco. She reports that she does not drink alcohol or use drugs.    Review of Systems:  General: No complaints or constitutional symptoms  Skin: No complaints or symptoms   Hematologic/Lymphatic: No symptoms or complaints  Psychiatric: No symptoms or complaints  Endocrine: No excessive fatigue, no hypermetabolic symptoms reported  Respiratory: No cough, shortness of breath, or wheezing  Cardiovascular: No chest pain or dyspnea on  "exertion  Gastrointestinal: As per HPI  Musculoskeletal: No recent injuries reported  Neurological: No focal neurologic defects reported.      EXAM:  /84   Pulse 74   Resp 16   Ht 5' 0.13\" (1.527 m)   Wt 169 lb (76.7 kg)   SpO2 98%   BMI 32.86 kg/m    Body mass index is 32.86 kg/m .  General: Alert, cooperative, appears stated age   Skin: Skin color, texture, turgor normal, no rashes or lesions   Lymphatic: No obvious adenopathy, no swelling   Eyes: No scleral icterus, pupils equal  HENT: No traumatic injury to the head or face, no gross abnormalities  Lungs: Normal respiratory effort, breath sounds equal bilaterally  Heart: Regular rate and rhythm  Abdomen: Soft, nondistended, nontender to palpation  Musculoskeletal: No obvious swelling or deformities  Neurologic: Grossly intact    LABS:  Lab Results   Component Value Date    WBC 4.6 02/20/2019    HGB 14.4 02/20/2019    HCT 43.1 02/20/2019    MCV 88 02/20/2019     02/20/2019         Lab Results   Component Value Date    ALT 34 02/20/2019    AST 27 02/20/2019    ALKPHOS 101 02/20/2019    BILITOT 1.3 (H) 02/20/2019       IMAGES:   Pertinent images personally reviewed by myself and discussed with the patient.  Radiology reports:  EXAM: US ABDOMINAL AORTA  LOCATION: [facility]  DATE/TIME: 10/30/2019 8:16 AM     INDICATION: family history rupture abdominal aortic aneurysm  COMPARISON: None.  TECHNIQUE: Transverse and longitudinal images of the aorta. Color flow and spectral Doppler with waveform analysis.     FINDINGS: No abdominal aortic aneurysm.  There is very mild atheromatous plaque in the abdominal aorta.     Incidental note of cholelithiasis.     MEASUREMENTS:  Proximal Aorta: 2.5 x 2.5 cm.  Mid Aorta: 1.8 x 1.7 cm.  Distal Aorta: 1.8 x 1.8 cm.  Right Common Iliac Artery: 1.2 x 1.2 cm.  Left Common Iliac Artery: 1.1 x 1.1 cm.     IMPRESSION:   1.  No abdominal aortic aneurysm.  2.  Cholelithiasis.    Assessment/Plan:   Cristiana Padilla " is a 59 y.o. female with incidental cholelithiasis seen on abdominal aortic ultrasound.  I have explained the pathophysiology of gallbladder disease in detail as well as the surgical versus non-operative management strategies.      The risks of surgery were discussed in detail which include, but are not limited to, bleeding, infection, injury to surrounding structures, the need to convert to an open procedure, blood clots, stroke, heart attack and death.  Specifically we discussed the risk of damage to the common bile duct and hepatic vessels, and the complications that may arise from damage to these structures.  Additionally, the risks of non operative management were discussed which include, but are not limited to, worsening infection, increased pain, sepsis and death.     Ultimately, her symptoms are atypical for biliary disease.  Her subscapular pain may be due to her stones.  Ultimately, we would not know if any of her symptoms were due to her gallbladder and last it was surgically removed and then she remained symptom-free.  It would be equally reasonable to monitor her conservatively and work on a well-balanced low-fat diet.  It would also be reasonable to pursue cholecystectomy to help prevent future gallstone attacks, cholecystitis, or cholangitis.  Her and her  would like to think about these options.  Preoperative orders were placed if she were to pursue cholecystectomy.  We would plan on it at the outpatient surgery center under general anesthesia.  Postoperative recovery and restrictions were discussed.  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  I would anticipate she would need a week off of work.  She will give us a call back if she has any further questions, concerns, or would like to schedule surgery.    Eneida Mon, DO   [facility]  [phone]

## 2021-06-04 VITALS
DIASTOLIC BLOOD PRESSURE: 86 MMHG | HEIGHT: 60 IN | OXYGEN SATURATION: 95 % | TEMPERATURE: 98.5 F | SYSTOLIC BLOOD PRESSURE: 128 MMHG | WEIGHT: 173 LBS | BODY MASS INDEX: 33.96 KG/M2 | RESPIRATION RATE: 20 BRPM | HEART RATE: 79 BPM

## 2021-06-06 NOTE — PROGRESS NOTES
Assessment:      Healthy female exam.    1. Routine general medical examination at a health care facility  Comprehensive Metabolic Panel    Lipid Profile    HM1(CBC and Differential)    HM1 (CBC with Diff)   2. Screening for HIV (human immunodeficiency virus)  HIV Antigen/Antibody Diagnostic [place]   3. Screening for viral disease  Hepatitis C Antibody (Anti-HCV)   4. Immunization due  Varicella Zoster, Recombinant Vaccine IM          Plan:      This is a 58 yo female here for physical exam:  1.  Health Maintenance - patient will have labs done; discussed recommendation for adult screening for HIV, hepatitis C - ordered; due for Shingrix  Vaccine - discussed /ordered.  2.  H/o iron deficiency - check hemogram     Subjective:      Cristiana Padilla is a 59 y.o. female who presents for an annual exam.  The patient reports that there is not domestic violence in her life.     Here for physical -   Still working - another 3-4 years    Will restart Claritin - is still taking nasal spray      Healthy Habits:   Regular Exercise: Yes and treadmill - exercise bike  Sunscreen Use: Yes  Healthy Diet: Yes  Dental Visits Regularly: Yes  Seat Belt: Yes  Sexually active: Yes  Self Breast Exam Monthly:Yes  Hemoccults: No  Flex Sig: No  Colonoscopy: 5/24/16        Immunization History   Administered Date(s) Administered     Hep A, historic 08/23/2001, 10/05/2010     INFLUENZA,RECOMBINANT,INJ,PF QUADRIVALENT 18+YRS 10/28/2019     Influenza, seasonal,quad inj 6-35 mos 09/26/2012     Influenza,seasonal quad, PF, =/> 6months 10/26/2018     Influenza,seasonal,quad inj =/> 6months 12/07/2015     Td, adult adsorbed, PF 10/26/2018     Tdap 10/24/2008     ZOSTER, RECOMBINANT, IM 02/26/2020     Immunization status: reviewed.    No exam data present    Gynecologic History  No LMP recorded. Patient is postmenopausal.  Contraception: post menopausal status  Last Pap: 2/20/19. Results were: normal  Last mammogram: 8/2019. Results were:  normal      OB History    Para Term  AB Living   5 4 4   1 4   SAB TAB Ectopic Multiple Live Births   1              # Outcome Date GA Lbr Chava/2nd Weight Sex Delivery Anes PTL Lv   5 Term            4 Term            3 Term            2 Term            1 SAB                Current Outpatient Medications   Medication Sig Dispense Refill     azithromycin (ZITHROMAX Z-ELISA) 250 MG tablet Take 2 tablets (500 mg) on  Day 1,  followed by 1 tablet (250 mg) once daily on Days 2 through 5. 6 tablet 0     loratadine (CLARITIN) 10 mg tablet Take 1 tablet (10 mg total) by mouth daily. 90 tablet 4     mometasone (NASONEX) 50 mcg/actuation nasal spray 2 sprays into each nostril daily. 51 g 4     pseudoephedrine (SUDAFED) 30 MG tablet Take 1 tablet (30 mg total) by mouth every 6 (six) hours as needed for congestion. 96 tablet 4     No current facility-administered medications for this visit.      Past Medical History:   Diagnosis Date     Acute Sinusitis     Created by Conversion      Bruxism     Created by Conversion      Generalized Teeth Erosion     Created by Conversion      Menopause age 53     Tingling (Paresthesia)     Created by Conversion      Past Surgical History:   Procedure Laterality Date     WISDOM TOOTH EXTRACTION       Patient has no known allergies.  Family History   Problem Relation Age of Onset     Clotting disorder Father         had Waldenstroms macroglobulinemia     Colon cancer Mother 62     Aortic aneurysm Mother      Liver disease Sister 56     Alcohol abuse Maternal Grandfather      Social History     Socioeconomic History     Marital status:      Spouse name: Not on file     Number of children: Not on file     Years of education: Not on file     Highest education level: Not on file   Occupational History     Not on file   Social Needs     Financial resource strain: Not on file     Food insecurity     Worry: Not on file     Inability: Not on file     Transportation needs     Medical:  "Not on file     Non-medical: Not on file   Tobacco Use     Smoking status: Never Smoker     Smokeless tobacco: Never Used   Substance and Sexual Activity     Alcohol use: No     Drug use: No     Sexual activity: Yes     Partners: Male     Birth control/protection: Post-menopausal   Lifestyle     Physical activity     Days per week: Not on file     Minutes per session: Not on file     Stress: Not on file   Relationships     Social connections     Talks on phone: Not on file     Gets together: Not on file     Attends Latter day service: Not on file     Active member of club or organization: Not on file     Attends meetings of clubs or organizations: Not on file     Relationship status: Not on file     Intimate partner violence     Fear of current or ex partner: Not on file     Emotionally abused: Not on file     Physically abused: Not on file     Forced sexual activity: Not on file   Other Topics Concern     Not on file   Social History Narrative    ; lives with        Review of Systems  Review of Systems      Pertinent positives as noted in HPI; otherwise 12 point ROS negative.      Objective:         Vitals:    02/26/20 0941   BP: 112/75   Pulse: 63   Resp: 14   Weight: 174 lb (78.9 kg)   Height: 5' 1\" (1.549 m)     Body mass index is 32.88 kg/m .    Physical  Physical Exam    EXAM:  /75 (Patient Site: Left Arm, Patient Position: Sitting, Cuff Size: Adult Large)   Pulse 63   Resp 14   Ht 5' 1\" (1.549 m)   Wt 174 lb (78.9 kg)   BMI 32.88 kg/m     Gen:  NAD, appears well, well-hydrated  HEENT:  TMs nl, oropharynx benign, nasal mucosa nl, conjunctiva clear  Neck:  Supple, no adenopathy, no thyromegaly, no carotid bruits, no JVD  Lungs:  Clear to auscultation bilaterally  Breast exam:  No breast lumps, no skin changes, no nipple discharge, no axillary adenopathy  Cor:  RRR no murmur  Abd:  Soft, nontender, BS+, no masses, no guarding or rebound, no HSM  Extr:  Neg.  Neuro:  No asymmetry, Nl motor " tone/strength, nl sensation, reflexes =, gait nl, nl coordination, CN intact,   Skin:  Warm/dry

## 2021-06-06 NOTE — TELEPHONE ENCOUNTER
Question following Office Visit  When did you see your provider: today   What is your question: Patient states she does not need HIV lab test requesting to cancel the order .   Okay to leave a detailed message: No

## 2021-06-07 NOTE — TELEPHONE ENCOUNTER
Refill Approved    Rx renewed per Medication Renewal Policy. Medication was last renewed on 2/20/19.    Angeli Gallo, [facility] Triage/Med Refill 4/17/2020     Requested Prescriptions   Pending Prescriptions Disp Refills     loratadine (CLARITIN) 10 mg tablet 90 tablet 4     Sig: Take 1 tablet (10 mg total) by mouth daily.       Antihistamine Refill Protocol Passed - 4/16/2020 11:05 AM        Passed - Patient has had office visit/physical in last year     Last office visit with prescriber/PCP: 10/28/2019 Gema Prajapati MD OR same dept: 10/28/2019 Gema Prajapati MD OR same specialty: 11/13/2019 Molly Beasley MD  Last physical: 2/26/2020 Last MTM visit: Visit date not found   Next visit within 3 mo: Visit date not found  Next physical within 3 mo: Visit date not found  Prescriber OR PCP: Gema Prajapati MD  Last diagnosis associated with med order: 1. Non-seasonal allergic rhinitis due to other allergic trigger  - loratadine (CLARITIN) 10 mg tablet; Take 1 tablet (10 mg total) by mouth daily.  Dispense: 90 tablet; Refill: 4    If protocol passes may refill for 12 months if within 3 months of last provider visit (or a total of 15 months).

## 2021-06-08 NOTE — PROGRESS NOTES
Sinus infection  Post nasal drip  Four days.   grandkids dog   No fever.  Teeth pain and frontal pain.   Ears plugged.    No sob  No chills    Allergic rhinitis doing well with medications controlling congestion      Very prn     Seven grandkids    ROS: as noted above    OBJECTIVE:   Vitals:    01/05/17 1021   BP: 100/72   Pulse: 76   Resp: 20   Temp: 97.7  F (36.5  C)      Head: atraumatic   Eyes: nl eom, anicteric   Ears: nl external ears     Mild frontal sinus tenderness    Neck: nl nodes, supple   Lungs: clear to ausc   Heart: regular rhythm  Back: no tenderness  Abd: soft nontender   Joints: uninflamed   Ext: nontender calves   Mental: euthymic  Neuro: no weakness  Gait: normal  ASSESSMENT/PLAN:    1. Acute Sinusitis  azithromycin (ZITHROMAX) 250 MG tablet   2. Allergic rhinitis  fluticasone (FLONASE) 50 mcg/actuation nasal spray     Anticipate resolution otherwise return.  Return sooner if symptoms worsen.  More than 10 of fifteen total minutes time spent education counseling regarding the issues and care of same as listed in the assessment and plan of this note

## 2021-06-09 NOTE — TELEPHONE ENCOUNTER
Two weeks of sinus pain and pressure. Using pseudophed and nasal spray without relief. Pain at 6.5 for headache, 4 for sinus pain. She has ear pain, like they are plugged. I connected with scheduling for a telephone appointment today with Dr. Lyons @ 2:40 p.m.  Celeste Travis RN  [facility]      Reason for Disposition    Earache    Additional Information    Negative: Sounds like a life-threatening emergency to the triager    Negative: Difficulty breathing, and not from stuffy nose (e.g., not relieved by cleaning out the nose)    Negative: SEVERE headache and has fever    Negative: Patient sounds very sick or weak to the triager    Negative: SEVERE sinus pain     Pain at 6.5    Negative: Severe headache     Pain at 6.5    Negative: Redness or swelling on the cheek, forehead, or around the eye    Negative: Fever > 103 F (39.4 C)    Negative: Fever > 101 F (38.3 C) and over 60 years of age    Negative: Fever > 100.0 F (37.8 C) and has diabetes mellitus or a weak immune system (e.g., HIV positive, cancer chemotherapy, organ transplant, splenectomy, chronic steroids)    Negative: Fever > 100.0 F (37.8 C) and bedridden (e.g., nursing home patient, stroke, chronic illness, recovering from surgery)    Negative: Fever present > 3 days (72 hours)    Negative: Fever returns after gone for over 24 hours and symptoms worse or not improved    Negative: Sinus pain (not just congestion) and fever    Protocols used: SINUS PAIN AND CONGESTION-A-[place]

## 2021-06-09 NOTE — PROGRESS NOTES
"Cristiana Padilla is a 60 y.o. female who is being evaluated via a billable telephone visit.      The patient has been notified of following:     \"This telephone visit will be conducted via a call between you and your physician/provider. We have found that certain health care needs can be provided without the need for a physical exam.  This service lets us provide the care you need with a short phone conversation.  If a prescription is necessary we can send it directly to your pharmacy.  If lab work is needed we can place an order for that and you can then stop by our lab to have the test done at a later time.    Telephone visits are billed at different rates depending on your insurance coverage. During this emergency period, for some insurers they may be billed the same as an in-person visit.  Please reach out to your insurance provider with any questions.    If during the course of the call the physician/provider feels a telephone visit is not appropriate, you will not be charged for this service.\"    Patient has given verbal consent to a Telephone visit? Yes    What phone number would you like to be contacted at? [phone]     Patient would like to receive their AVS by AVS Preference: Mail a copy.    Additional provider notes: Sinus\" nasal congestion, facial pressure, nasal drainage, for the past few days has been feeling more pressure in the upper part of her face above her eyes, has been stuffed up, every time she yawns she feels a pressure in the ears.  Has been taking loratadine, Nasonex with no significant improvement.  She was treated for sinusitis last fall, she took Z-Elisa and symptoms improved.  Has been on amoxicillin in the past, she took some many of that that is not helping anymore.  Also mentioned right hand finger clicking with movement, no injury.  No significant pain or discomfort.  Assessment/Plan:  1. Acute recurrent maxillary sinusitis  - azithromycin (ZITHROMAX Z-ELISA) 250 MG tablet; Take 2 " tablets (500 mg) on  Day 1,  followed by 1 tablet (250 mg) once daily on Days 2 through 5.  Dispense: 6 tablet; Refill: 0    2. Snapping finger    Acute sinusitis, we discussed treatment option, nasal washes etc., has taken amoxicillin at  a young age, does not seem to help for her anymore, has been on azithromycin with mild improvement, prescription was refilled, possible side effect discussed.  Right finger clicking, differential diagnosis discussed tendinitis osteoarthritis etc., she will follow-up with a clinic visit if she still not improving.    Phone call duration:  12 minutes    Yaneth Lyons MD

## 2021-06-10 NOTE — PROGRESS NOTES
Subjective:    Cristiana Padilla is a 56 y.o. female who presents for evaluation of 2 concerns.:  1.  Abdominal pain.  She has had off-and-on abdominal pain for over a week.  It is not improving.  She says every time she eats she gets sharp pains in her abdomen.  It is across the right and left sides.  Sometimes she does get diarrhea as well.  Diarrhea seems to be improving a little bit over the past few days.  She denies any nausea or vomiting.  No fevers.  No vaginal discharge.  No concerns for STDs.  She does not get pain when she drinks liquids, only when she eats foods.  It does not matter what type of food she eats.  No known sick contacts.  No one in the family with known H pylori infection.  Usually the pains can last up to 2 hours.  They do not radiate.  She took Rolaids last night and that did help somewhat.  No past history of significant GERD symptoms.  She does not recall any significant recent dietary changes.    2.  Left adnexal discomfort.  This is a little bit more recent than her abdominal pain.  Not necessarily connected.  She says she just feels a little pressure or discomfort in that area.  No specific pain.  She has never had anything like this before.  This is the lesser of her 2 concerns.    Patient Active Problem List   Diagnosis     Contusion With Intact Skin Surface Of The Left Ring Finger     Acute Sinusitis     Bruxism     Pica     Generalized Teeth Erosion     Tingling (Paresthesia)     Cerumen impaction     Vertigo     Hyperplastic colon polyp       Current Outpatient Prescriptions:      chlorhexidine (PERIDEX) 0.12 % solution, , Disp: , Rfl:      fluticasone (FLONASE) 50 mcg/actuation nasal spray, Instil 1 to 2 sprays each nostril every day, Disp: 16 g, Rfl: 12     omeprazole (PRILOSEC) 20 MG capsule, Take 1 capsule (20 mg total) by mouth 2 (two) times a day., Disp: 28 capsule, Rfl: 0     Objective:   Allergies:  Review of patient's allergies indicates no known  allergies.    Vitals:  Vitals:    04/13/17 1510   BP: 128/84   Pulse: 64   Resp: 16   Temp: 97  F (36.1  C)     Body mass index is 30.86 kg/(m^2).    Vital signs reviewed.  General: Patient is alert and oriented x 3, in no apparent distress  Cardiac: regular rate and rhythm, no murmurs  Pulmonary: lungs clear to auscultation bilaterally, no crackles, rales, rhonchi, or wheezing noted  Abdomen: Non tender to palpation, no hepatosplenomegaly, negative Middleton's sign, no pain over McBurney's point, positive bowel sounds, no masses palpable  Mild discomfort with palpation in the left adnexal area    Results for orders placed or performed in visit on 04/13/17   Hemoglobin   Result Value Ref Range    Hemoglobin 14.2 12.0 - 16.0 g/dL    other labs pending.    Assessment and Plan:   1.  Abdominal pain.  Differential includes viral gastroenteritis, GERD, gall bladder disease, H. pylori, stomach ulcer, nonspecific abdominal pain, versus other.  She does not have an acute abdomen today.  I discussed treatment options with patient today.  Labs were drawn and I will follow-up with those results.  Start on omeprazole twice daily for the next 1-2 weeks.  I reviewed reasons to go to the emergency room or call us over the weekend.  If all labs are normal, she can probably try the omeprazole for 2 weeks and see how she does.  We can also refer her to GI for follow-up as well.  She agrees with this plan.    2.  Left adnexal discomfort.  Not too bothersome today.  She has no concerns for STDs.  I reviewed possibility of this being related to her GI symptoms.  Also possibly could be ovarian cyst.  She will continue to monitor this.  I reviewed reasons to notify us of worsening symptoms.    This dictation uses voice recognition software, which may contain typographical errors.

## 2021-06-11 NOTE — TELEPHONE ENCOUNTER
Orders being requested: creatinine level serum due every six month per patient.  No order noted. Patient also is having iisue that her urine is colorless for days.  Transferred to triage for assessment then will go to scheduling to make lab appointment.   Reason service is needed/diagnosis: follow up  When are orders needed by: Today  Where to send Orders: in chart  Okay to leave detailed message?  No

## 2021-06-12 NOTE — TELEPHONE ENCOUNTER
Pt is calling.    She is wanting an appointment for a COVID test.  She had direct exposure to someone who tested positive for COVID-19 yesterday. She is currently asymptomatic. The other person had cold symptoms.   I advised her that she can do a virtual visit through [employer]. It is suggested to isolate at home for 14 days since the last day of exposure, which was yesterday. If able, wait to be tested for COVID for at least 48-72 hours as it takes a few days to show up. More likely to get a false positive if tested too soon.  Care advice reviewed and she verbalized understanding.    COVID 19 Nurse Triage Plan/Patient Instructions    Please be aware that novel coronavirus (COVID-19) may be circulating in the community. If you develop symptoms such as fever, cough, or SOB or if you have concerns about the presence of another infection including coronavirus (COVID-19), please contact your health care provider or visit [email or web address].     Disposition/Instructions    Virtual Visit with provider recommended. Reference Visit Selection Guide.    Thank you for taking steps to prevent the spread of this virus.  o Limit your contact with others.  o Wear a simple mask to cover your cough.  o Wash your hands well and often.    Resources    [facility]: About COVID-19: [email or web address]    CDC: What to Do If You're Sick: www.cdc.gov/coronavirus/2019-ncov/about/steps-when-sick.html    CDC: Ending Home Isolation: www.cdc.gov/coronavirus/2019-ncov/hcp/disposition-in-home-patients.html     CDC: Caring for Someone: www.cdc.gov/coronavirus/2019-ncov/if-you-are-sick/care-for-someone.html     [facility]: Interim Guidance for Hospital Discharge to Home: www.health.[facility].[place]/diseases/coronavirus/hcp/hospdischarge.pdf    [facility] clinical trials (COVID-19 research studies): clinicalaffairs.[facility]/umn-clinical-trials     Below are the COVID-19 hotlines at the [place] Department of Health ([facility]).  Interpreters are available.   o For health questions: Call 140-761-5783 or 1-951.700.1527 (7 a.m. to 7 p.m.)  o For questions about schools and childcare: Call 547-041-3492 or 1-384.759.5199 (7 a.m. to 7 p.m.)     Reason for Disposition    [1] COVID-19 EXPOSURE (Close Contact) AND [2] within last 14 days BUT [3] NO symptoms    Additional Information    Negative: COVID-19 has been diagnosed by a healthcare provider (HCP)    Negative: COVID-19 lab test positive    Negative: [1] Symptoms of COVID-19 (e.g., cough, fever, SOB, or others) AND [2] lives in an area with community spread    Negative: [1] Symptoms of COVID-19 (e.g., cough, fever, SOB, or others) AND [2] within 14 days of EXPOSURE (close contact) with diagnosed or suspected COVID-19 patient    Negative: [1] Symptoms of COVID-19 (e.g., cough, fever, SOB, or others) AND [2] within 14 days of travel from high-risk area for COVID-19 community spread (identified by CDC)    Negative: [1] Difficulty breathing (shortness of breath) occurs AND [2] onset > 14 days after COVID-19 EXPOSURE (Close Contact) AND [3] no community spread where patient lives    Negative: [1] Dry cough occurs AND [2] onset > 14 days after COVID-19 EXPOSURE AND [3] no community spread where patient lives    Negative: [1] Wet cough (i.e., white-yellow, yellow, green, or belkys colored sputum) AND [2] onset > 14 days after COVID-19 EXPOSURE AND [3] no community spread where patient lives    Negative: [1] Common cold symptoms AND [2] onset > 14 days after COVID-19 EXPOSURE AND [3] no community spread where patient lives    Negative: [1] COVID-19 EXPOSURE (Close Contact) within last 14 days AND [2] needs COVID-19 lab test to return to work AND [3] NO symptoms    Negative: [1] COVID-19 EXPOSURE (Close Contact) within last 14 days AND [2] exposed person is a healthcare worker who was NOT using all recommended personal protective equipment (i.e., a respirator-N95 mask, eye protection, gloves, and gown)  AND [3] NO symptoms    Protocols used: CORONAVIRUS (COVID-19) EXPOSURE-A- 8.4.20    Brittany Pate RN   [facility] Nurse Advisor  10/10/20 at 6:21 PM

## 2021-06-16 PROBLEM — K80.20 CALCULUS OF GALLBLADDER WITHOUT CHOLECYSTITIS WITHOUT OBSTRUCTION: Status: ACTIVE | Noted: 2019-11-01

## 2021-06-16 PROBLEM — E66.811 OBESITY (BMI 30.0-34.9): Status: ACTIVE | Noted: 2019-01-25

## 2021-06-16 PROBLEM — N18.31 STAGE 3A CHRONIC KIDNEY DISEASE (H): Status: ACTIVE | Noted: 2021-05-14

## 2021-06-16 PROBLEM — D50.9 IRON DEFICIENCY ANEMIA: Status: ACTIVE | Noted: 2019-02-20

## 2021-06-17 NOTE — TELEPHONE ENCOUNTER
Reason for Call:  Other   lans    Detailed comments: pt is calling because she said she has some questions regarding her recent labs    Phone Number Patient can be reached at: Home number on file [phone] (home)    Best Time: any    Can we leave a detailed message on this number?: Yes    Call taken on 5/19/2021 at 12:36 PM by Anny Jones

## 2021-06-17 NOTE — PATIENT INSTRUCTIONS - HE
Patient Instructions by Rafaela Heredia RT (R) at 10/28/2019  7:45 AM     Author: Rafaela Heredia RT (R) Service: -- Author Type: Radiologic Technologist    Filed: 10/28/2019  8:23 AM Encounter Date: 10/28/2019 Status: Signed    : Gema Prajapati MD (Physician)

## 2021-06-17 NOTE — PROGRESS NOTES
"Assessment:      Healthy female exam.      1. Routine general medical examination at a health care facility  Lipid [place] FASTING    Comprehensive Metabolic Panel    Thyroid Stimulating Hormone (TSH)   2. Non-seasonal allergic rhinitis due to other allergic trigger  loratadine (CLARITIN) 10 mg tablet    mometasone (NASONEX) 50 mcg/actuation nasal spray    pseudoephedrine (SUDAFED) 30 MG tablet   3. Stage 3a chronic kidney disease     4. Easy bruising  HM2(CBC w/o Differential)   5. Vitamin D deficiency  Vitamin D, Total (25-Hydroxy)       Plan:      This is a 61 yo female here for physical exam:  1.  Allergic rhinitis - some seasonal component but mostly chronic - uses loratadine, sudafed, nasonex nasal spray; generally controlled with prudent use of these medications  2.  Stage 3a chronic kidney disease - creatinine has been trending upward - recheck labs  3.  Easy bruising - no clear pattern to bruising; will check blood counts  4.  Vitamin D deficiency - check levels    Subjective:      Cristiana Padilla is a 60 y.o. female who presents for an annual exam.  The patient reports that there is not domestic violence in her life.     Staying healthy - no COVID  Had COVID vaccine -     7 grandchildren - between 4 and 7 yo    Discussed living will - \"It's on my list\"  Sister in law passed away - no will - ALS -     Healthy Habits:   Regular Exercise: Yes  Sunscreen Use: No  Healthy Diet: trying to eat better - no fried foods;  down several pounds  Dental Visits Regularly: Yes  Seat Belt: Yes  Sexually active: Yes  Self Breast Exam Monthly:not regularly  Hemoccults: No  Flex Sig: No  Colonoscopy: 5/13/2021, mild diverticulosis          Immunization History   Administered Date(s) Administered     COVID-19,PF,Pfizer 03/19/2021, 04/10/2021     Hep A, historic 08/23/2001, 10/05/2010     INFLUENZA,RECOMBINANT,INJ,PF QUADRIVALENT 18+YRS 10/28/2019, 10/03/2020     INFLUENZA,SEASONAL QUAD, PF, =/> 6months 10/26/2018     Influenza, " seasonal,quad inj 6-35 mos 2012     Influenza,seasonal,quad inj =/> 6months 2015     Td, adult adsorbed, PF 10/26/2018     Tdap 10/24/2008     ZOSTER, RECOMBINANT, IM 2020, 10/03/2020     Immunization status: reviewed.    No exam data present    Gynecologic History  No LMP recorded. Patient is postmenopausal.  Contraception: none  Last Pap: . Results were: normal  Last mammogram: 2020. Results were: normal      OB History    Para Term  AB Living   5 4 4   1 4   SAB TAB Ectopic Multiple Live Births   1              # Outcome Date GA Lbr Chava/2nd Weight Sex Delivery Anes PTL Lv   5 Term            4 Term            3 Term            2 Term            1 SAB                Current Outpatient Medications   Medication Sig Dispense Refill     loratadine (CLARITIN) 10 mg tablet Take 1 tablet (10 mg total) by mouth daily. 90 tablet 3     mometasone (NASONEX) 50 mcg/actuation nasal spray 2 sprays into each nostril daily. 51 g 4     pseudoephedrine (SUDAFED) 30 MG tablet Take 1 tablet (30 mg total) by mouth every 6 (six) hours as needed for congestion. 96 tablet 1     No current facility-administered medications for this visit.      Past Medical History:   Diagnosis Date     Acute Sinusitis     Created by Conversion      Bruxism     Created by Conversion      Generalized Teeth Erosion     Created by Conversion      Menopause age 53     Tingling (Paresthesia)     Created by Conversion      Past Surgical History:   Procedure Laterality Date     WISDOM TOOTH EXTRACTION       Cat dander, Dog dander, and Grass pollen  Family History   Problem Relation Age of Onset     Clotting disorder Father         had Waldenstroms macroglobulinemia     Colon cancer Mother 62     Aortic aneurysm Mother      Liver disease Sister 56     Alcohol abuse Maternal Grandfather      Social History     Socioeconomic History     Marital status:      Spouse name: Not on file     Number of children: Not on file  "    Years of education: Not on file     Highest education level: Not on file   Occupational History     Not on file   Social Needs     Financial resource strain: Not on file     Food insecurity     Worry: Not on file     Inability: Not on file     Transportation needs     Medical: Not on file     Non-medical: Not on file   Tobacco Use     Smoking status: Never Smoker     Smokeless tobacco: Never Used   Substance and Sexual Activity     Alcohol use: No     Drug use: No     Sexual activity: Yes     Partners: Male     Birth control/protection: Post-menopausal   Lifestyle     Physical activity     Days per week: Not on file     Minutes per session: Not on file     Stress: Not on file   Relationships     Social connections     Talks on phone: Not on file     Gets together: Not on file     Attends [employer] service: Not on file     Active member of club or organization: Not on file     Attends meetings of clubs or organizations: Not on file     Relationship status: Not on file     Intimate partner violence     Fear of current or ex partner: Not on file     Emotionally abused: Not on file     Physically abused: Not on file     Forced sexual activity: Not on file   Other Topics Concern     Not on file   Social History Narrative    ; lives with        Review of Systems  Review of Systems     Pertinent positives as noted in HPI; otherwise 12 point ROS negative.        Objective:         Vitals:    05/14/21 0728   BP: 115/78   Pulse: 69   Resp: 16   Weight: 165 lb (74.8 kg)   Height: 5' 1\" (1.549 m)     Body mass index is 31.18 kg/m .    Physical  Physical Exam     EXAM:  /78 (Patient Site: Left Arm, Patient Position: Sitting, Cuff Size: Adult Large)   Pulse 69   Resp 16   Ht 5' 1\" (1.549 m)   Wt 165 lb (74.8 kg)   BMI 31.18 kg/m     Gen:  NAD, appears well, well-hydrated  HEENT:  TMs nl, oropharynx benign, nasal mucosa nl, conjunctiva clear  Neck:  Supple, no adenopathy, no thyromegaly, no carotid " bruits, no JVD  Lungs:  Clear to auscultation bilaterally  Breast exam:  No breast lumps, no skin changes, no nipple discharge, no axillary adenopathy  Cor:  RRR no murmur  Abd:  Soft, nontender, BS+, no masses, no guarding or rebound, no HSM  Extr:  Neg., no swelling, no edema, no significant djd findings  Neuro:  No asymmetry, Nl motor tone/strength, nl sensation, reflexes =, gait nl, nl coordination, CN intact,   Skin:  Warm/dry

## 2021-06-18 NOTE — LETTER
Letter by Gema Prajapati MD at      Author: Gema Prajapati MD Service: -- Author Type: --    Filed:  Encounter Date: 3/11/2019 Status: (Other)       Cristiana Padilla  [address] [facility] [address]             March 11, 2019         Dear Ms. Padilla,    Below are the results from your recent visit:    Resulted Orders   CT Head With Contrast    Narrative    EXAM DATE:         03/01/2019    [facility] RADIOLOGY    EXAM: CT HEAD WITHOUT CONTRAST  LOCATION: [facility]  DATE/TIME: 03/01/2019, 10:00 AM    INDICATION: Dizziness and giddiness.  COMPARISON: None.  TECHNIQUE: Routine without IV contrast. Multiplanar reformats. Dose reduction  techniques were used.    FINDINGS:  INTRACRANIAL CONTENTS: No intracranial hemorrhage, extraaxial collection, or  mass effect.  No CT evidence of acute infarct. Small focus of white matter  hypoattenuation in the right frontal lobe is nonspecific, but likely reflecting  a tiny focus of chronic small vessel ischemic change. Otherwise, unremarkable  appearance to the brain parenchyma. Hypoattenuating arachnoid granulation at the  junction of the left transverse and sigmoid sinus is incidental. The ventricles  and sulci are normal for age.    VISUALIZED ORBITS/SINUSES/MASTOIDS: No significant orbital abnormality. No  significant paranasal sinus mucosal disease. No significant middle ear or  mastoid effusion.    OSSEOUS STRUCTURES/SOFT TISSUES: No significant abnormality.    CONCLUSION:  1.  Small focus of white matter hypoattenuation in the right frontal lobe is  nonspecific, but thought to reflect a small focus of chronic small vessel  ischemic change. Otherwise, unremarkable appearance to the brain parenchyma with  no CT findings for recent large vascular distribution infarct and no CT findings  to suggest an abnormal mass. No intracranial hemorrhage and no significant  intraparenchymal volume loss.    DICOM format image data is available to  "non-affiliated external healthcare  facilities or entities on a secure, media-free, reciprocally searchable basis  with patient authorization for at least a 12-month period after the study.                 Your head CT scan looks pretty good!   If you read the report, they describe one little spot of \"chronic small vessel change\" - these frequently come from high blood pressure - watch the blood pressures.  Otherwise, there would be nothing to do about this.  The rest is \"normal!\"    Please call with questions or contact us using [employer].    Sincerely,        Electronically signed by Gema Prajapati MD       "

## 2021-06-18 NOTE — LETTER
Letter by Gema Prajapati MD at      Author: Gema Prajapati MD Service: -- Author Type: --    Filed:  Encounter Date: 3/11/2019 Status: (Other)       Cristiana Padilla  [address] [facility] [address]             March 11, 2019         Dear MsArmida Padilla,    Below are the results from your recent visit:    Resulted Orders   Gynecologic Cytology (PAP Smear)   Result Value Ref Range    Case Report       Gynecologic Cytology Report                       Case: [id number]                                   Authorizing Provider:  Victorina,         Collected:           02/20/2019 0835                                     MD Gema                                                                 Ordering Location:     [facility]  Received:            02/20/2019 0836                                     Medicine/OB                                                                  First Screen:          Doreen Hall CT                                                                               (ASCP)                                                                       Specimen:    SUREPATH PAP, SCREENING, Endocervical/cervical                                             Interpretation  Negative for squamous intraepithelial lesion or malignancy.      Negative for squamous intraepithelial lesion or malignancy    Result Flag Normal Normal    Specimen Adequacy       Satisfactory for evaluation, endocervical/transformation zone component present    HPV Reflex? Yes regardless of result     HIGH RISK No     LMP/Menopause Date Postmenopausal     Abnormal Bleeding No     Pt Status Not Applicable     Birth Control/Hormones None     Previous Normal/Date 02/10/14     Prev Abn Date/Dx none     Cervical Appearance normal    Wet Prep, Vaginal   Result Value Ref Range    Yeast Result No yeast seen No yeast seen    Trichomonas No Trichomonas seen No Trichomonas seen    Clue Cells, Wet  Prep No Clue cells seen No Clue cells seen   Iron and Transferrin Iron Binding Capacity   Result Value Ref Range    Iron 100 42 - 175 ug/dL    Transferrin 265 212 - 360 mg/dL    Transferrin Saturation, Calculated 30 20 - 50 %    Transferrin IBC, Calculated 331 313 - 563 ug/dL   Ferritin   Result Value Ref Range    Ferritin 72 10 - 130 ng/mL   Comprehensive Metabolic Panel   Result Value Ref Range    Sodium 141 136 - 145 mmol/L    Potassium 3.9 3.5 - 5.0 mmol/L    Chloride 103 98 - 107 mmol/L    CO2 26 22 - 31 mmol/L    Anion Gap, Calculation 12 5 - 18 mmol/L    Glucose 97 70 - 125 mg/dL    BUN 12 8 - 22 mg/dL    Creatinine 1.06 0.60 - 1.10 mg/dL    GFR MDRD Af Amer >60 >60 mL/min/1.73m2    GFR MDRD Non Af Amer 53 (L) >60 mL/min/1.73m2    Bilirubin, Total 1.3 (H) 0.0 - 1.0 mg/dL    Calcium 9.8 8.5 - 10.5 mg/dL    Protein, Total 7.9 6.0 - 8.0 g/dL    Albumin 4.2 3.5 - 5.0 g/dL    Alkaline Phosphatase 101 45 - 120 U/L    AST 27 0 - 40 U/L    ALT 34 0 - 45 U/L    Narrative    Fasting Glucose reference range is 70-99 mg/dL per  American Diabetes Association (ADA) guidelines.   Lipid Profile   Result Value Ref Range    Triglycerides 159 (H) <=149 mg/dL    Cholesterol 238 (H) <=199 mg/dL    LDL Calculated 154 (H) <=129 mg/dL    HDL Cholesterol 52 >=50 mg/dL    Patient Fasting > 8hrs? Yes    Thyroid Stimulating Hormone (TSH)   Result Value Ref Range    TSH 2.13 0.30 - 5.00 uIU/mL   HM1 (CBC with Diff)   Result Value Ref Range    WBC 4.6 4.0 - 11.0 thou/uL    RBC 4.92 3.80 - 5.40 mill/uL    Hemoglobin 14.4 12.0 - 16.0 g/dL    Hematocrit 43.1 35.0 - 47.0 %    MCV 88 80 - 100 fL    MCH 29.3 27.0 - 34.0 pg    MCHC 33.4 32.0 - 36.0 g/dL    RDW 12.5 11.0 - 14.5 %    Platelets 267 140 - 440 thou/uL    MPV 9.9 8.5 - 12.5 fL    Neutrophils % 62 50 - 70 %    Lymphocytes % 27 20 - 40 %    Monocytes % 8 2 - 10 %    Eosinophils % 2 0 - 6 %    Basophils % 1 0 - 2 %    Neutrophils Absolute 2.8 2.0 - 7.7 thou/uL    Lymphocytes Absolute 1.2  0.8 - 4.4 thou/uL    Monocytes Absolute 0.4 0.0 - 0.9 thou/uL    Eosinophils Absolute 0.1 0.0 - 0.4 thou/uL    Basophils Absolute 0.1 0.0 - 0.2 thou/uL   HPV High Risk DNA Cervical   Result Value Ref Range    HPV Source SurePath     HPV16 DNA Negative NEG    HPV18 DNA Negative NEG    Other HR HPV Negative NEG    Final Diagnosis SEE NOTES       Comment:      This patient's sample is negative for HPV DNA.  This test was developed and its performance characteristics determined by the  [facility], [facility]. It  has not been cleared or approved by the FDA. The laboratory is regulated under  CLIA as qualified to perform high-complexity testing. This test is used for  clinical purposes. It should not be regarded as investigational or for  research.  (Note)  METHODOLOGY:  The Roche iron 4800 system uses automated extraction,  simultaneous amplification of HPV (L1 region) and beta-globin,  followed by  real time detection of fluorescent labeled HPV and beta  globin using specific oligonucleotide probes . The test specifically  identifies types HPV 16 DNA and HPV 18 DNA while concurrently  detecting the rest of the high risk types (31, 33, 35, 39, 45, 51,  52, 56, 58, 59, 66 or 68).    COMMENTS:  This test is not intended for use as a screening device  for women under age 30 with normal cervical   cytology.  Results should  be correlated with cytologic and histologic findings. Close clinical  followup is recommended.        Specimen Description Cervical Cells       Comment:      PERFORMED AT  [address] [address]        Labs are all stable - no sign of infection, normal pap smear.     Please call with questions or contact us using [employer].    Sincerely,        Electronically signed by Gema Prajapati MD

## 2021-06-19 NOTE — LETTER
Letter by Gema Prajapati MD at      Author: Gema Prajapati MD Service: -- Author Type: --    Filed:  Encounter Date: 8/6/2019 Status: (Other)         Cristiana Padilla  [address] [facility] [address]             August 6, 2019         Dear Ms. Padilla,    Below are the results from your recent visit:    Resulted Orders   Mammo Screening Bilateral    Narrative    BILATERAL FULL FIELD DIGITAL SCREENING MAMMOGRAM WITH TOMOSYNTHESIS    Performed on: 8/6/19.      Compared to: 08/01/2018 Mammo Screening Bilateral, 04/18/2016 Mammo   Screening Bilateral, and 08/15/2013 Mammo Screening Bilateral      Findings: The breasts have scattered areas of fibroglandular densities.   There is no radiographic evidence of malignancy. This study was evaluated   with the assistance of Computer-Aided Detection. Breast Tomosynthesis was   used in interpretation. Repeat routine screening mammogram in one year is   recommended.      ACR BI-RADS Category 1: Negative       Your mammogram is normal.     Please call with questions or contact us using [employer].    Sincerely,        Electronically signed by Gema Prajapati MD
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
## 2021-06-19 NOTE — LETTER
Letter by Gema Prajapati MD at      Author: Gema Prajapati MD Service: -- Author Type: --    Filed:  Encounter Date: 11/1/2019 Status: Signed         Cristiana Padilla  [address] [facility] [address]             November 1, 2019         Dear Ms. Padilla,    Below are the results from your recent visit:    Resulted Orders   US Abdominal Aorta    Narrative    EXAM: US ABDOMINAL AORTA  LOCATION: [facility]  DATE/TIME: 10/30/2019 8:16 AM    INDICATION: family history rupture abdominal aortic aneurysm  COMPARISON: None.  TECHNIQUE: Transverse and longitudinal images of the aorta. Color flow and spectral Doppler with waveform analysis.    FINDINGS: No abdominal aortic aneurysm.  There is very mild atheromatous plaque in the abdominal aorta.    Incidental note of cholelithiasis.    MEASUREMENTS:  Proximal Aorta: 2.5 x 2.5 cm.  Mid Aorta: 1.8 x 1.7 cm.  Distal Aorta: 1.8 x 1.8 cm.  Right Common Iliac Artery: 1.2 x 1.2 cm.  Left Common Iliac Artery: 1.1 x 1.1 cm.      Impression    1.  No abdominal aortic aneurysm.  2.  Cholelithiasis.           This is the results we discussed by phone.    Please call with questions or contact us using [employer].    Sincerely,        Electronically signed by Gema Prajapati MD

## 2021-06-20 NOTE — LETTER
Letter by Gema Prajapati MD at      Author: Gema Prajapati MD Service: -- Author Type: --    Filed:  Encounter Date: 3/2/2020 Status: (Other)         Cristiana Padilla  [address] [facility] [address]             March 2, 2020         Dear Ms. Padilla,    Below are the results from your recent visit:    Resulted Orders   Hepatitis C Antibody (Anti-HCV)   Result Value Ref Range    Hepatitis C Ab Negative Negative   HIV Antigen/Antibody Diagnostic Cascade   Result Value Ref Range    HIV Antigen / Antibody Negative Negative    Narrative    Method is Abbott HIV Ag/Ab for the detection of HIV p24 antigen, HIV-1 antibodies and HIV-2 antibodies.   Comprehensive Metabolic Panel   Result Value Ref Range    Sodium 141 136 - 145 mmol/L    Potassium 4.2 3.5 - 5.0 mmol/L    Chloride 105 98 - 107 mmol/L    CO2 26 22 - 31 mmol/L    Anion Gap, Calculation 10 5 - 18 mmol/L    Glucose 79 70 - 125 mg/dL    BUN 14 8 - 22 mg/dL    Creatinine 1.12 (H) 0.60 - 1.10 mg/dL    GFR MDRD Af Amer 60 (L) >60 mL/min/1.73m2    GFR MDRD Non Af Amer 50 (L) >60 mL/min/1.73m2    Bilirubin, Total 1.5 (H) 0.0 - 1.0 mg/dL    Calcium 9.5 8.5 - 10.5 mg/dL    Protein, Total 7.6 6.0 - 8.0 g/dL    Albumin 4.1 3.5 - 5.0 g/dL    Alkaline Phosphatase 107 45 - 120 U/L    AST 27 0 - 40 U/L    ALT 33 0 - 45 U/L    Narrative    Fasting Glucose reference range is 70-99 mg/dL per  American Diabetes Association (ADA) guidelines.   Lipid Profile   Result Value Ref Range    Triglycerides 120 <=149 mg/dL    Cholesterol 245 (H) <=199 mg/dL    LDL Calculated 166 (H) <=129 mg/dL    HDL Cholesterol 55 >=50 mg/dL    Patient Fasting > 8hrs? Yes    HM1 (CBC with Diff)   Result Value Ref Range    WBC 3.9 (L) 4.0 - 11.0 thou/uL    RBC 4.63 3.80 - 5.40 mill/uL    Hemoglobin 13.8 12.0 - 16.0 g/dL    Hematocrit 41.2 35.0 - 47.0 %    MCV 89 80 - 100 fL    MCH 29.8 27.0 - 34.0 pg    MCHC 33.4 32.0 - 36.0 g/dL    RDW 12.3 11.0 - 14.5 %    Platelets 232 140 -  440 thou/uL    MPV 7.2 7.0 - 10.0 fL    Neutrophils % 51 50 - 70 %    Lymphocytes % 34 20 - 40 %    Monocytes % 10 2 - 10 %    Eosinophils % 5 0 - 6 %    Basophils % 1 0 - 2 %    Neutrophils Absolute 2.0 2.0 - 7.7 thou/uL    Lymphocytes Absolute 1.3 0.8 - 4.4 thou/uL    Monocytes Absolute 0.4 0.0 - 0.9 thou/uL    Eosinophils Absolute 0.2 0.0 - 0.4 thou/uL    Basophils Absolute 0.0 0.0 - 0.2 thou/uL       Labs are generally stable - the one thing we need to follow is the Creatinine (kidney function).  This is just slightly worse than previous - let's repeat this in 3 months.      Please call with questions or contact us using [employer].    Sincerely,        Electronically signed by Gema Prajapati MD

## 2021-06-20 NOTE — LETTER
Letter by Gema Prajapati MD at      Author: Gema Prajapati MD Service: -- Author Type: --    Filed:  Encounter Date: 9/25/2020 Status: (Other)         Cristiana Padilla  [address] [facility] [address]             September 25, 2020         Dear Ms. Padilla,    Below are the results from your recent visit:    Resulted Orders   Mammo Screening Bilateral    Narrative    BILATERAL FULL FIELD DIGITAL SCREENING MAMMOGRAM WITH TOMOSYNTHESIS    Performed on: 9/22/20.      Compared to: 08/06/2019 Mammo Screening Bilateral, 08/01/2018 Mammo   Screening Bilateral, 04/18/2016 Mammo Screening Bilateral, 08/20/2013   Mammo Diagnostic Left, 08/15/2013 Mammo Screening Bilateral, and   04/05/2011 Mammo Diagnostic Bilateral      Findings: The breasts have scattered areas of fibroglandular densities.   There is no radiographic evidence of malignancy. This study was evaluated   with the assistance of Computer-Aided Detection. Breast Tomosynthesis was   used in interpretation. Repeat routine screening mammogram in one year is   recommended.      ACR BI-RADS Category 1: Negative       Your mammogram is normal.     Please call with questions or contact us using [employer].    Sincerely,        Electronically signed by Gema Prajapati MD

## 2021-06-21 NOTE — PROGRESS NOTES
Assessment/ Plan  1. Myalgia  Vague feeling of muscle aches over the last week associated with other symptoms below.  No obvious illness.  V suggested we check urinalysis which was negative  - Urinalysis-UC if Indicated    2. Numbness and tingling  Normal reflexes and monofilament testing, 1 week in duration, all 4 extremities, will follow    3. Acute rhinitis  Very mild, vague symptoms.  Just started taking over-the-counter steroid, agree with this plan, give it some time to work, follow-up if worsening    4. Healthcare maintenance  Flu and tetanus vaccines given    Body mass index is 31.69 kg/(m^2).    Subjective  CC:  Chief Complaint   Patient presents with     health concerns     Sinus problems     Aches and pains     HPI:  Suspected sinusitis  ___________________________  Duration/ Timing/ context-chronic stuffed up no nasal discharge  Following sx  Nasal Drainage/ quality? None seen  Facial Pressure/severity?/no  Pain in Teeth?  No  Cough? Very mild  Sore Throat? no  Other sx: Achiness all over, some chest discomfort over the last 2 days  Hx of sinusitis?  Yes      Right side of neck is sore    Left upper abdomen pain    Irritation of the chest wall- 2 days      Numbness and tingling in hands and feet  Narrative- onset about 1 week ago  ___________________________  Location-all 4 extremities  Duration/ Timing/ context-1 week of symptoms, fairly sudden onset  Quality/ Severity-moderate  Context: No alcohol, no history or family history of diabetes, no new medications  Patient Active Problem List   Diagnosis     Contusion With Intact Skin Surface Of The Left Ring Finger     Acute Sinusitis     Bruxism     Pica     Generalized Teeth Erosion     Tingling (Paresthesia)     Cerumen impaction     Vertigo     Hyperplastic colon polyp     Current medications reviewed as follows:  No current outpatient prescriptions on file prior to visit.     No current facility-administered medications on file prior to visit.       History   Smoking Status     Never Smoker   Smokeless Tobacco     Never Used     Social History     Social History Narrative    ; lives with      Patient Care Team:  Gema Prajapati MD as PCP - General  ROS  Full 10 system review including constitutional, respiratory, cardiac, gi, urinary, rheumatologic, neurologic, reproductive, dermatologic psychiatric is  performed  and is otherwise negative         Objective  Physical Exam  Vitals:    10/26/18 1024   BP: 128/76   Patient Site: Left Arm   Patient Position: Sitting   Cuff Size: Adult Regular   Pulse: 74   Resp: 16   Temp: 97.6  F (36.4  C)   TempSrc: Oral   SpO2: 97%   Weight: 170 lb 8 oz (77.3 kg)     Gen- alert, oriented x3  No acute distress  Chest- Normal inspiration and expiration.    Clear to ascultation.    No chest wall deformity or scar.  Reproducible tenderness, bilaterally, costosternal margin, about eighth rib  CV- Heart regular rate and rhythm  normal tones   no murmurs   No gallops or rubs.  Ext- warm and dry   no edema  Skin-  no visualized rash  Foot monofilament test performed-  Sensation intact, sensation checked on hands as well which is normal.  Reflexes 2-3+ both patella and are symmetric  Diagnostics  Results for orders placed or performed in visit on 10/26/18   Urinalysis-UC if Indicated   Result Value Ref Range    Color, UA Yellow Colorless, Yellow, Straw, Light Yellow    Clarity, UA Clear Clear    Glucose, UA Negative Negative    Bilirubin, UA Negative Negative    Ketones, UA Negative Negative    Specific Gravity, UA 1.025 1.005 - 1.030    Blood, UA Trace (!) Negative    pH, UA 5.5 5.0 - 8.0    Protein, UA Negative Negative mg/dL    Urobilinogen, UA 0.2 E.U./dL 0.2 E.U./dL, 1.0 E.U./dL    Nitrite, UA Negative Negative    Leukocytes, UA Negative Negative         Please note: Voice recognition software was used in this dictation.  It may therefore contain typographical errors.

## 2021-06-23 NOTE — TELEPHONE ENCOUNTER
"Call from pt     CC: ongoing sinus drainage     Has been going on for >month ago (last encounter was 10/26/2018)     Sinus draining and pressure   Some dizziness noted     Nasal congestion  - \"Continually stuffed up\"   No fever   Some occasional cough   L side ear \"fullness\"     At home   Humidifier and Nasonex (mometasone)   nasal spray daily     Agree with at home care so far - appt made due to persistence of this       Jose Luis Franco, RN   Triage and Medication Refills        Reason for Disposition    Sinus congestion (pressure, fullness) present > 10 days    Protocols used: SINUS PAIN AND CONGESTION-A-OH      "

## 2021-06-23 NOTE — PATIENT INSTRUCTIONS - HE
Previous allergy testing showed: DUST, CATS, dogs, grass.    Medicine    Claritin 10 mg a day    Nasonex 2 sprays a day (chin to chest, opposite arm)    Pseudoephedrine 30 mg take 2 pills ONLY when you have sinus pressure, ear pressure, or dizzy. Not before bed.    Environmental    Consider cleaning ducts    Make vacuum has HEPA filter    Pillow case covers    Consider seeing an allergist to learn what you're allergic to and how to eliminate it.   Consider allergy shots.
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
## 2021-06-28 ENCOUNTER — RECORDS - HEALTHEAST (OUTPATIENT)
Dept: ADMINISTRATIVE | Facility: OTHER | Age: 61
End: 2021-06-28

## 2021-07-06 ENCOUNTER — HOSPITAL ENCOUNTER (OUTPATIENT)
Dept: ULTRASOUND IMAGING | Facility: HOSPITAL | Age: 61
Discharge: HOME | End: 2021-07-06
Attending: INTERNAL MEDICINE
Payer: COMMERCIAL

## 2021-07-06 DIAGNOSIS — D50.8 OTHER IRON DEFICIENCY ANEMIA: ICD-10-CM

## 2021-07-06 DIAGNOSIS — E66.811 OBESITY, CLASS I, BMI 30-34.9: ICD-10-CM

## 2021-07-06 DIAGNOSIS — N18.31 STAGE 3A CHRONIC KIDNEY DISEASE (H): ICD-10-CM

## 2021-07-21 ENCOUNTER — RECORDS - HEALTHEAST (OUTPATIENT)
Dept: ADMINISTRATIVE | Facility: CLINIC | Age: 61
End: 2021-07-21

## 2021-08-30 ENCOUNTER — TELEPHONE (OUTPATIENT)
Dept: FAMILY MEDICINE | Facility: CLINIC | Age: 61
End: 2021-08-30

## 2021-08-30 DIAGNOSIS — K80.20 CALCULUS OF GALLBLADDER WITHOUT CHOLECYSTITIS WITHOUT OBSTRUCTION: Primary | ICD-10-CM

## 2021-08-30 NOTE — TELEPHONE ENCOUNTER
Reason for Call: Request for an order or referral:    Order or referral being requested: pt saw a  surgeon last year for her gallbladder she would like to see her again not sure who it is?    Date needed: as soon as possible    Has the patient been seen by the PCP for this problem? YES    Additional comments: pt is having the tightness around her chest to the back and stomach pains after she eats   Phone number Patient can be reached at:  Home number on file [phone] (home)    Best Time: anytime  Can we leave a detailed message on this number?  YES    Call taken on 8/30/2021 at 10:09 AM by Lindy Soto

## 2021-09-14 ENCOUNTER — TRANSFERRED RECORDS (OUTPATIENT)
Dept: HEALTH INFORMATION MANAGEMENT | Facility: CLINIC | Age: 61
End: 2021-09-14

## 2021-09-22 NOTE — PROGRESS NOTES
"History:   Cristiana Padilla is a 61 year old female referred back to general surgery by Dr. Michael for cholelithiasis.  She was seen by myself in 2019 for what appeared to be incidental cholelithiasis.  Since that time, she believes that she has had a few gallstone attacks. About 1.5 months ago she was out of town and had significant right upper quadrant abdominal pain that radiated around her body. It lasted for a couple of hours and was associated with nausea and vomiting. About 3 weeks ago she had a more mild attack and it only led to discomfort. This episode resolved on its own. Due to the symptoms, she is interested in pursuing cholecystectomy to help prevent future episodes.    Current medications:     cholecalciferol 25 MCG (1000 UT) TABS, Take 1 tablet by mouth, Disp: , Rfl:      mometasone (NASONEX) 50 mcg/actuation nasal spray, [MOMETASONE (NASONEX) 50 MCG/ACTUATION NASAL SPRAY] 2 sprays into each nostril daily., Disp: 51 g, Rfl: 4     multivitamin (ONE-DAILY) tablet, , Disp: , Rfl:      pseudoephedrine (SUDAFED) 30 MG tablet, [PSEUDOEPHEDRINE (SUDAFED) 30 MG TABLET] Take 1 tablet (30 mg total) by mouth every 6 (six) hours as needed for congestion., Disp: 96 tablet, Rfl: 1    Exam:  /80 (BP Location: Right arm, Patient Position: Sitting, Cuff Size: Adult Regular)   Ht 1.549 m (5' 1\")   Wt 76.5 kg (168 lb 11.2 oz)   BMI 31.88 kg/m    Body mass index is 31.88 kg/m .  General: Alert, cooperative, appears stated age   Skin: Skin color, texture, turgor normal, no rashes or lesions   Lymphatic: No obvious adenopathy, no swelling   Eyes: No scleral icterus, pupils equal  HENT: No traumatic injury to the head or face, no gross abnormalities  Lungs: Normal respiratory effort, breath sounds equal bilaterally  Heart: Regular rate and rhythm  Abdomen: Soft, nondistended, nontender to palpation  Musculoskeletal: No obvious swelling or deformities  Neurologic: Grossly intact    Imaging:   Pertinent images " personally reviewed by myself and discussed with the patient.  Radiology reports:  EXAM: US ABDOMINAL AORTA  LOCATION: [facility]  DATE/TIME: 10/30/2019 8:16 AM     INDICATION: family history rupture abdominal aortic aneurysm  COMPARISON: None.  TECHNIQUE: Transverse and longitudinal images of the aorta. Color flow and spectral Doppler with waveform analysis.     FINDINGS: No abdominal aortic aneurysm.  There is very mild atheromatous plaque in the abdominal aorta.     Incidental note of cholelithiasis.     MEASUREMENTS:  Proximal Aorta: 2.5 x 2.5 cm.  Mid Aorta: 1.8 x 1.7 cm.  Distal Aorta: 1.8 x 1.8 cm.  Right Common Iliac Artery: 1.2 x 1.2 cm.  Left Common Iliac Artery: 1.1 x 1.1 cm.     IMPRESSION:  1.  No abdominal aortic aneurysm.  2.  Cholelithiasis.    Assessment/Plan:   Cristiana Padilla is a 61 year old female with signs and symptoms consistent with biliary colic.  I have explained the pathophysiology of gallbladder disease in detail as well as the surgical versus non-operative management strategies.  The risks of surgery and anesthesia include, but are not limited to, bleeding, infection, injury to surrounding structures, the need to convert to an open procedure, blood clots, stroke, heart attack and death.  Specifically we discussed the risk of damage to the common bile duct and hepatic vessels, and the complications that may arise from damage to these structures.  Additionally, the risks of non-operative management were discussed which include, but are not limited to, infection, increased pain, sepsis and death.     She understands everything which was discussed and is interested in pursuing surgical management.  Therefore, preoperative orders have been placed for laparoscopic cholecystectomy at the outpatient surgery center.  Postoperative recovery and restrictions were discussed. I anticipate she will want to take about 1 week off of work.  We will schedule surgery accordingly.     Eneida  DO Elieser  General Surgeon  [facility]  Surgery [address]  [place] [address]  Office: [phone]  Employed by - [facility]

## 2021-09-22 NOTE — H&P (VIEW-ONLY)
"History:   Cristiana Padilla is a 61 year old female referred back to general surgery by Dr. Michael for cholelithiasis.  She was seen by myself in 2019 for what appeared to be incidental cholelithiasis.  Since that time, she believes that she has had a few gallstone attacks. About 1.5 months ago she was out of town and had significant right upper quadrant abdominal pain that radiated around her body. It lasted for a couple of hours and was associated with nausea and vomiting. About 3 weeks ago she had a more mild attack and it only led to discomfort. This episode resolved on its own. Due to the symptoms, she is interested in pursuing cholecystectomy to help prevent future episodes.    Current medications:     cholecalciferol 25 MCG (1000 UT) TABS, Take 1 tablet by mouth, Disp: , Rfl:      mometasone (NASONEX) 50 mcg/actuation nasal spray, [MOMETASONE (NASONEX) 50 MCG/ACTUATION NASAL SPRAY] 2 sprays into each nostril daily., Disp: 51 g, Rfl: 4     multivitamin (ONE-DAILY) tablet, , Disp: , Rfl:      pseudoephedrine (SUDAFED) 30 MG tablet, [PSEUDOEPHEDRINE (SUDAFED) 30 MG TABLET] Take 1 tablet (30 mg total) by mouth every 6 (six) hours as needed for congestion., Disp: 96 tablet, Rfl: 1    Exam:  /80 (BP Location: Right arm, Patient Position: Sitting, Cuff Size: Adult Regular)   Ht 1.549 m (5' 1\")   Wt 76.5 kg (168 lb 11.2 oz)   BMI 31.88 kg/m    Body mass index is 31.88 kg/m .  General: Alert, cooperative, appears stated age   Skin: Skin color, texture, turgor normal, no rashes or lesions   Lymphatic: No obvious adenopathy, no swelling   Eyes: No scleral icterus, pupils equal  HENT: No traumatic injury to the head or face, no gross abnormalities  Lungs: Normal respiratory effort, breath sounds equal bilaterally  Heart: Regular rate and rhythm  Abdomen: Soft, nondistended, nontender to palpation  Musculoskeletal: No obvious swelling or deformities  Neurologic: Grossly intact    Imaging:   Pertinent images " personally reviewed by myself and discussed with the patient.  Radiology reports:  EXAM: US ABDOMINAL AORTA  LOCATION: [facility]  DATE/TIME: 10/30/2019 8:16 AM     INDICATION: family history rupture abdominal aortic aneurysm  COMPARISON: None.  TECHNIQUE: Transverse and longitudinal images of the aorta. Color flow and spectral Doppler with waveform analysis.     FINDINGS: No abdominal aortic aneurysm.  There is very mild atheromatous plaque in the abdominal aorta.     Incidental note of cholelithiasis.     MEASUREMENTS:  Proximal Aorta: 2.5 x 2.5 cm.  Mid Aorta: 1.8 x 1.7 cm.  Distal Aorta: 1.8 x 1.8 cm.  Right Common Iliac Artery: 1.2 x 1.2 cm.  Left Common Iliac Artery: 1.1 x 1.1 cm.     IMPRESSION:  1.  No abdominal aortic aneurysm.  2.  Cholelithiasis.    Assessment/Plan:   Cristiana Padilla is a 61 year old female with signs and symptoms consistent with biliary colic.  I have explained the pathophysiology of gallbladder disease in detail as well as the surgical versus non-operative management strategies.  The risks of surgery and anesthesia include, but are not limited to, bleeding, infection, injury to surrounding structures, the need to convert to an open procedure, blood clots, stroke, heart attack and death.  Specifically we discussed the risk of damage to the common bile duct and hepatic vessels, and the complications that may arise from damage to these structures.  Additionally, the risks of non-operative management were discussed which include, but are not limited to, infection, increased pain, sepsis and death.     She understands everything which was discussed and is interested in pursuing surgical management.  Therefore, preoperative orders have been placed for laparoscopic cholecystectomy at the outpatient surgery center.  Postoperative recovery and restrictions were discussed. I anticipate she will want to take about 1 week off of work.  We will schedule surgery accordingly.     Eneida  DO Elieser  General Surgeon  [facility]  Surgery [address]  [place] [address]  Office: [phone]  Employed by - [facility]

## 2021-09-23 ENCOUNTER — OFFICE VISIT (OUTPATIENT)
Dept: SURGERY | Facility: CLINIC | Age: 61
End: 2021-09-23
Payer: COMMERCIAL

## 2021-09-23 VITALS
HEIGHT: 61 IN | WEIGHT: 168.7 LBS | DIASTOLIC BLOOD PRESSURE: 80 MMHG | BODY MASS INDEX: 31.85 KG/M2 | SYSTOLIC BLOOD PRESSURE: 132 MMHG

## 2021-09-23 DIAGNOSIS — K80.20 CALCULUS OF GALLBLADDER WITHOUT CHOLECYSTITIS WITHOUT OBSTRUCTION: ICD-10-CM

## 2021-09-23 DIAGNOSIS — K80.50 BILIARY COLIC: Primary | ICD-10-CM

## 2021-09-23 PROCEDURE — 99214 OFFICE O/P EST MOD 30 MIN: CPT | Performed by: SURGERY

## 2021-09-23 RX ORDER — MULTIVITAMIN
1 TABLET ORAL DAILY
COMMUNITY

## 2021-09-23 ASSESSMENT — MIFFLIN-ST. JEOR: SCORE: 1262.6

## 2021-09-23 NOTE — LETTER
"    9/23/2021         RE: Crist[facility]  [address] [facility] [address]        Dear Colleague,    Thank you for referring your patient, Cristiana Padilla, to the [facility] [place]. Please see a copy of my visit note below.    History:   Cristiana Padilla is a 61 year old female referred back to general surgery by Dr. Michael for cholelithiasis.  She was seen by myself in 2019 for what appeared to be incidental cholelithiasis.  Since that time, she believes that she has had a few gallstone attacks. About 1.5 months ago she was out of town and had significant right upper quadrant abdominal pain that radiated around her body. It lasted for a couple of hours and was associated with nausea and vomiting. About 3 weeks ago she had a more mild attack and it only led to discomfort. This episode resolved on its own. Due to the symptoms, she is interested in pursuing cholecystectomy to help prevent future episodes.    Current medications:     cholecalciferol 25 MCG (1000 UT) TABS, Take 1 tablet by mouth, Disp: , Rfl:      mometasone (NASONEX) 50 mcg/actuation nasal spray, [MOMETASONE (NASONEX) 50 MCG/ACTUATION NASAL SPRAY] 2 sprays into each nostril daily., Disp: 51 g, Rfl: 4     multivitamin (ONE-DAILY) tablet, , Disp: , Rfl:      pseudoephedrine (SUDAFED) 30 MG tablet, [PSEUDOEPHEDRINE (SUDAFED) 30 MG TABLET] Take 1 tablet (30 mg total) by mouth every 6 (six) hours as needed for congestion., Disp: 96 tablet, Rfl: 1    Exam:  /80 (BP Location: Right arm, Patient Position: Sitting, Cuff Size: Adult Regular)   Ht 1.549 m (5' 1\")   Wt 76.5 kg (168 lb 11.2 oz)   BMI 31.88 kg/m    Body mass index is 31.88 kg/m .  General: Alert, cooperative, appears stated age   Skin: Skin color, texture, turgor normal, no rashes or lesions   Lymphatic: No obvious adenopathy, no swelling   Eyes: No scleral icterus, pupils equal  HENT: No traumatic injury to the head or face, no gross " abnormalities  Lungs: Normal respiratory effort, breath sounds equal bilaterally  Heart: Regular rate and rhythm  Abdomen: Soft, nondistended, nontender to palpation  Musculoskeletal: No obvious swelling or deformities  Neurologic: Grossly intact    Imaging:   Pertinent images personally reviewed by myself and discussed with the patient.  Radiology reports:  EXAM: US ABDOMINAL AORTA  LOCATION: [facility]  DATE/TIME: 10/30/2019 8:16 AM     INDICATION: family history rupture abdominal aortic aneurysm  COMPARISON: None.  TECHNIQUE: Transverse and longitudinal images of the aorta. Color flow and spectral Doppler with waveform analysis.     FINDINGS: No abdominal aortic aneurysm.  There is very mild atheromatous plaque in the abdominal aorta.     Incidental note of cholelithiasis.     MEASUREMENTS:  Proximal Aorta: 2.5 x 2.5 cm.  Mid Aorta: 1.8 x 1.7 cm.  Distal Aorta: 1.8 x 1.8 cm.  Right Common Iliac Artery: 1.2 x 1.2 cm.  Left Common Iliac Artery: 1.1 x 1.1 cm.     IMPRESSION:  1.  No abdominal aortic aneurysm.  2.  Cholelithiasis.    Assessment/Plan:   Cristiana Padilla is a 61 year old female with signs and symptoms consistent with biliary colic.  I have explained the pathophysiology of gallbladder disease in detail as well as the surgical versus non-operative management strategies.  The risks of surgery and anesthesia include, but are not limited to, bleeding, infection, injury to surrounding structures, the need to convert to an open procedure, blood clots, stroke, heart attack and death.  Specifically we discussed the risk of damage to the common bile duct and hepatic vessels, and the complications that may arise from damage to these structures.  Additionally, the risks of non-operative management were discussed which include, but are not limited to, infection, increased pain, sepsis and death.     She understands everything which was discussed and is interested in pursuing surgical management.  Therefore,  preoperative orders have been placed for laparoscopic cholecystectomy at the outpatient surgery center.  Postoperative recovery and restrictions were discussed. I anticipate she will want to take about 1 week off of work.  We will schedule surgery accordingly.     Eneida Mon DO  General Surgeon  [facility]  Surgery [address]  [place] [address]  Office: [phone]  Employed by - [employer]        Again, thank you for allowing me to participate in the care of your patient.        Sincerely,        Eneida Mon DO

## 2021-09-24 ENCOUNTER — TELEPHONE (OUTPATIENT)
Dept: SURGERY | Facility: CLINIC | Age: 61
End: 2021-09-24

## 2021-09-24 NOTE — LETTER
We've received instruction to get you scheduled for surgery with Dr Mon. We have that arranged as follows:     Surgery Date: 10/12/2021  Location: [address], [address] (3rd floor) [facility]  Arrival Time: 7:00 am (Unless instructed differently by the pre-op call nurse)     Prep Instructions:     1. No pre op physical required, Dr. Mon will do this the morning of your surgery.    2. PCR-Rated COVID19 testing is required within 4 days of surgery. We [facility], [address] [place] [address] on 10/8/2021 at 4:30 pm. Follow the specific instructions you receive by Marjorie. If your test is positive, your surgery will be canceled.     3. Nothing to eat or drink for 8 hours before surgery unless instructed differently by the pre-op nurse.    4. If the community sees a new COVID19 surge, your procedure may need to be postponed. We will contact you if this happens.     5. You will need an adult to drive you home and stay with you 24 hours after surgery.     6. You may have one family member wait in the lobby at the surgery center during your surgery. Visitor restrictions are subject to change, please verify with the pre-op nurse when they call.    7. When you arrive to the surgery center, you will be screened for COVID19 symptoms. If you screen positive, your surgery will need to be postponed.    8. If the community sees a new surge in COVID19 your procedure may need to be postponed. We will contact you if this happens.    9. We always encourage you to notify your insurance any time you have medical tests or procedures scheduled including surgery. The number is usually right on the back of your insurance card. Please call [facility] Cost of Care at [phone] for the surgeon fees, and [facility] Cost of Care [phone] for facility fees if you need pricing information.     10. You will receive a call from a  pre-op call nurse 1-3 days prior to surgery. She will go over more details with you.     Call our office if you have any questions! Thank you!

## 2021-09-27 NOTE — TELEPHONE ENCOUNTER
Spoke with Cristiana over the phone today regarding surgery scheduling with Dr. Holbrook MSC on  date: 10/12/2021.    Covid Test: Date: 10/8/2021 at 4:30 mpw, Location: w    Letter sent via [employer]

## 2021-09-28 DIAGNOSIS — Z11.59 ENCOUNTER FOR SCREENING FOR OTHER VIRAL DISEASES: ICD-10-CM

## 2021-09-28 PROBLEM — K80.50 BILIARY COLIC: Status: ACTIVE | Noted: 2021-09-28

## 2021-10-08 ENCOUNTER — LAB (OUTPATIENT)
Dept: FAMILY MEDICINE | Facility: CLINIC | Age: 61
End: 2021-10-08
Payer: COMMERCIAL

## 2021-10-08 DIAGNOSIS — Z11.59 ENCOUNTER FOR SCREENING FOR OTHER VIRAL DISEASES: ICD-10-CM

## 2021-10-08 PROCEDURE — U0005 INFEC AGEN DETEC AMPLI PROBE: HCPCS

## 2021-10-08 PROCEDURE — U0003 INFECTIOUS AGENT DETECTION BY NUCLEIC ACID (DNA OR RNA); SEVERE ACUTE RESPIRATORY SYNDROME CORONAVIRUS 2 (SARS-COV-2) (CORONAVIRUS DISEASE [COVID-19]), AMPLIFIED PROBE TECHNIQUE, MAKING USE OF HIGH THROUGHPUT TECHNOLOGIES AS DESCRIBED BY CMS-2020-01-R: HCPCS

## 2021-10-08 ASSESSMENT — MIFFLIN-ST. JEOR: SCORE: 1253.75

## 2021-10-09 LAB — SARS-COV-2 RNA RESP QL NAA+PROBE: NEGATIVE

## 2021-10-11 ENCOUNTER — ANESTHESIA EVENT (OUTPATIENT)
Dept: SURGERY | Facility: AMBULATORY SURGERY CENTER | Age: 61
End: 2021-10-11
Payer: COMMERCIAL

## 2021-10-12 ENCOUNTER — ANESTHESIA (OUTPATIENT)
Dept: SURGERY | Facility: AMBULATORY SURGERY CENTER | Age: 61
End: 2021-10-12
Payer: COMMERCIAL

## 2021-10-12 ENCOUNTER — HOSPITAL ENCOUNTER (OUTPATIENT)
Facility: AMBULATORY SURGERY CENTER | Age: 61
End: 2021-10-12
Attending: SURGERY
Payer: COMMERCIAL

## 2021-10-12 VITALS
HEIGHT: 62 IN | DIASTOLIC BLOOD PRESSURE: 68 MMHG | HEART RATE: 56 BPM | WEIGHT: 165 LBS | OXYGEN SATURATION: 97 % | SYSTOLIC BLOOD PRESSURE: 114 MMHG | TEMPERATURE: 96.9 F | RESPIRATION RATE: 16 BRPM | BODY MASS INDEX: 30.36 KG/M2

## 2021-10-12 DIAGNOSIS — K80.50 BILIARY COLIC: ICD-10-CM

## 2021-10-12 PROCEDURE — 47562 LAPAROSCOPIC CHOLECYSTECTOMY: CPT | Performed by: SURGERY

## 2021-10-12 RX ORDER — TRAMADOL HYDROCHLORIDE 50 MG/1
50 TABLET ORAL EVERY 6 HOURS PRN
Qty: 10 TABLET | Refills: 0 | Status: SHIPPED | OUTPATIENT
Start: 2021-10-12 | End: 2021-10-15

## 2021-10-12 RX ORDER — FENTANYL CITRATE 50 UG/ML
INJECTION, SOLUTION INTRAMUSCULAR; INTRAVENOUS PRN
Status: DISCONTINUED | OUTPATIENT
Start: 2021-10-12 | End: 2021-10-12

## 2021-10-12 RX ORDER — FENTANYL CITRATE 50 UG/ML
25 INJECTION, SOLUTION INTRAMUSCULAR; INTRAVENOUS EVERY 5 MIN PRN
Status: DISCONTINUED | OUTPATIENT
Start: 2021-10-12 | End: 2021-10-13 | Stop reason: HOSPADM

## 2021-10-12 RX ORDER — BUPIVACAINE HYDROCHLORIDE AND EPINEPHRINE 2.5; 5 MG/ML; UG/ML
INJECTION, SOLUTION INFILTRATION; PERINEURAL PRN
Status: DISCONTINUED | OUTPATIENT
Start: 2021-10-12 | End: 2021-10-12 | Stop reason: HOSPADM

## 2021-10-12 RX ORDER — LIDOCAINE HYDROCHLORIDE 20 MG/ML
INJECTION, SOLUTION INFILTRATION; PERINEURAL PRN
Status: DISCONTINUED | OUTPATIENT
Start: 2021-10-12 | End: 2021-10-12

## 2021-10-12 RX ORDER — SODIUM CHLORIDE, SODIUM LACTATE, POTASSIUM CHLORIDE, AND CALCIUM CHLORIDE .6; .31; .03; .02 G/100ML; G/100ML; G/100ML; G/100ML
IRRIGANT IRRIGATION PRN
Status: DISCONTINUED | OUTPATIENT
Start: 2021-10-12 | End: 2021-10-12 | Stop reason: HOSPADM

## 2021-10-12 RX ORDER — PHENYLEPHRINE HYDROCHLORIDE 10 MG/ML
INJECTION INTRAVENOUS PRN
Status: DISCONTINUED | OUTPATIENT
Start: 2021-10-12 | End: 2021-10-12

## 2021-10-12 RX ORDER — OXYCODONE HYDROCHLORIDE 5 MG/1
5 TABLET ORAL EVERY 4 HOURS PRN
Status: DISCONTINUED | OUTPATIENT
Start: 2021-10-12 | End: 2021-10-13 | Stop reason: HOSPADM

## 2021-10-12 RX ORDER — DEXAMETHASONE SODIUM PHOSPHATE 4 MG/ML
INJECTION, SOLUTION INTRA-ARTICULAR; INTRALESIONAL; INTRAMUSCULAR; INTRAVENOUS; SOFT TISSUE PRN
Status: DISCONTINUED | OUTPATIENT
Start: 2021-10-12 | End: 2021-10-12

## 2021-10-12 RX ORDER — SODIUM CHLORIDE, SODIUM LACTATE, POTASSIUM CHLORIDE, CALCIUM CHLORIDE 600; 310; 30; 20 MG/100ML; MG/100ML; MG/100ML; MG/100ML
INJECTION, SOLUTION INTRAVENOUS CONTINUOUS
Status: DISCONTINUED | OUTPATIENT
Start: 2021-10-12 | End: 2021-10-13 | Stop reason: HOSPADM

## 2021-10-12 RX ORDER — ONDANSETRON 2 MG/ML
4 INJECTION INTRAMUSCULAR; INTRAVENOUS EVERY 30 MIN PRN
Status: DISCONTINUED | OUTPATIENT
Start: 2021-10-12 | End: 2021-10-13 | Stop reason: HOSPADM

## 2021-10-12 RX ORDER — ONDANSETRON 2 MG/ML
INJECTION INTRAMUSCULAR; INTRAVENOUS PRN
Status: DISCONTINUED | OUTPATIENT
Start: 2021-10-12 | End: 2021-10-12

## 2021-10-12 RX ORDER — CEFAZOLIN SODIUM 2 G/100ML
2 INJECTION, SOLUTION INTRAVENOUS
Status: COMPLETED | OUTPATIENT
Start: 2021-10-12 | End: 2021-10-12

## 2021-10-12 RX ORDER — LIDOCAINE 40 MG/G
CREAM TOPICAL
Status: DISCONTINUED | OUTPATIENT
Start: 2021-10-12 | End: 2021-10-13 | Stop reason: HOSPADM

## 2021-10-12 RX ORDER — HYDROMORPHONE HCL IN WATER/PF 6 MG/30 ML
0.2 PATIENT CONTROLLED ANALGESIA SYRINGE INTRAVENOUS EVERY 5 MIN PRN
Status: DISCONTINUED | OUTPATIENT
Start: 2021-10-12 | End: 2021-10-13 | Stop reason: HOSPADM

## 2021-10-12 RX ORDER — MEPERIDINE HYDROCHLORIDE 25 MG/ML
12.5 INJECTION INTRAMUSCULAR; INTRAVENOUS; SUBCUTANEOUS
Status: DISCONTINUED | OUTPATIENT
Start: 2021-10-12 | End: 2021-10-13 | Stop reason: HOSPADM

## 2021-10-12 RX ORDER — FENTANYL CITRATE 50 UG/ML
25 INJECTION, SOLUTION INTRAMUSCULAR; INTRAVENOUS
Status: DISCONTINUED | OUTPATIENT
Start: 2021-10-12 | End: 2021-10-13 | Stop reason: HOSPADM

## 2021-10-12 RX ORDER — NEOSTIGMINE METHYLSULFATE 1 MG/ML
VIAL (ML) INJECTION PRN
Status: DISCONTINUED | OUTPATIENT
Start: 2021-10-12 | End: 2021-10-12

## 2021-10-12 RX ORDER — PROPOFOL 10 MG/ML
INJECTION, EMULSION INTRAVENOUS PRN
Status: DISCONTINUED | OUTPATIENT
Start: 2021-10-12 | End: 2021-10-12

## 2021-10-12 RX ORDER — PROPOFOL 10 MG/ML
INJECTION, EMULSION INTRAVENOUS CONTINUOUS PRN
Status: DISCONTINUED | OUTPATIENT
Start: 2021-10-12 | End: 2021-10-12

## 2021-10-12 RX ORDER — GLYCOPYRROLATE 0.2 MG/ML
INJECTION, SOLUTION INTRAMUSCULAR; INTRAVENOUS PRN
Status: DISCONTINUED | OUTPATIENT
Start: 2021-10-12 | End: 2021-10-12

## 2021-10-12 RX ORDER — ACETAMINOPHEN 325 MG/1
975 TABLET ORAL ONCE
Status: COMPLETED | OUTPATIENT
Start: 2021-10-12 | End: 2021-10-12

## 2021-10-12 RX ORDER — ONDANSETRON 4 MG/1
4 TABLET, ORALLY DISINTEGRATING ORAL EVERY 30 MIN PRN
Status: DISCONTINUED | OUTPATIENT
Start: 2021-10-12 | End: 2021-10-13 | Stop reason: HOSPADM

## 2021-10-12 RX ADMIN — GLYCOPYRROLATE 0.2 MG: 0.2 INJECTION, SOLUTION INTRAMUSCULAR; INTRAVENOUS at 09:06

## 2021-10-12 RX ADMIN — Medication 30 MG: at 09:07

## 2021-10-12 RX ADMIN — SODIUM CHLORIDE, SODIUM LACTATE, POTASSIUM CHLORIDE, CALCIUM CHLORIDE: 600; 310; 30; 20 INJECTION, SOLUTION INTRAVENOUS at 08:54

## 2021-10-12 RX ADMIN — Medication 3.5 MG: at 10:32

## 2021-10-12 RX ADMIN — ONDANSETRON 4 MG: 2 INJECTION INTRAMUSCULAR; INTRAVENOUS at 09:06

## 2021-10-12 RX ADMIN — PROPOFOL 160 MG: 10 INJECTION, EMULSION INTRAVENOUS at 09:06

## 2021-10-12 RX ADMIN — SODIUM CHLORIDE, SODIUM LACTATE, POTASSIUM CHLORIDE, CALCIUM CHLORIDE: 600; 310; 30; 20 INJECTION, SOLUTION INTRAVENOUS at 10:32

## 2021-10-12 RX ADMIN — LIDOCAINE HYDROCHLORIDE 60 MG: 20 INJECTION, SOLUTION INFILTRATION; PERINEURAL at 09:06

## 2021-10-12 RX ADMIN — GLYCOPYRROLATE 0.5 MG: 0.2 INJECTION, SOLUTION INTRAMUSCULAR; INTRAVENOUS at 10:32

## 2021-10-12 RX ADMIN — ACETAMINOPHEN 975 MG: 325 TABLET ORAL at 08:55

## 2021-10-12 RX ADMIN — FENTANYL CITRATE 50 MCG: 50 INJECTION, SOLUTION INTRAMUSCULAR; INTRAVENOUS at 09:06

## 2021-10-12 RX ADMIN — FENTANYL CITRATE 50 MCG: 50 INJECTION, SOLUTION INTRAMUSCULAR; INTRAVENOUS at 09:22

## 2021-10-12 RX ADMIN — PROPOFOL 180 MCG/KG/MIN: 10 INJECTION, EMULSION INTRAVENOUS at 09:06

## 2021-10-12 RX ADMIN — PHENYLEPHRINE HYDROCHLORIDE 100 MCG: 10 INJECTION INTRAVENOUS at 10:29

## 2021-10-12 RX ADMIN — DEXAMETHASONE SODIUM PHOSPHATE 10 MG: 4 INJECTION, SOLUTION INTRA-ARTICULAR; INTRALESIONAL; INTRAMUSCULAR; INTRAVENOUS; SOFT TISSUE at 09:06

## 2021-10-12 RX ADMIN — CEFAZOLIN SODIUM 2 G: 2 INJECTION, SOLUTION INTRAVENOUS at 09:13

## 2021-10-12 ASSESSMENT — MIFFLIN-ST. JEOR: SCORE: 1253.75

## 2021-10-12 NOTE — ANESTHESIA PREPROCEDURE EVALUATION
Anesthesia Pre-Procedure Evaluation    Patient: Cristiana Padilla   MRN: [id number] : 1960        Preoperative Diagnosis: Biliary colic [K80.50]    Procedure : Procedure(s):  CHOLECYSTECTOMY, LAPAROSCOPIC          Past Medical History:   Diagnosis Date     Acute sinusitis, unspecified     Created by Conversion      Complication of anesthesia     Slow to wake after [place]     Disturbance of skin sensation     Created by Conversion      Erosion of teeth, generalized     Created by Conversion      Menopause age 53     Other specified psychophysiological malfunction     Created by Conversion       Past Surgical History:   Procedure Laterality Date     COLONOSCOPY       WISDOM TOOTH EXTRACTION        Allergies   Allergen Reactions     Cat Dander [Animal Dander] Itching     Dog Dander [Dog Epithelium] Itching     Grass Pollen [Grass] Itching      Social History     Tobacco Use     Smoking status: Never Smoker     Smokeless tobacco: Never Used   Substance Use Topics     Alcohol use: No      Wt Readings from Last 1 Encounters:   10/12/21 74.8 kg (165 lb)        Anesthesia Evaluation   Pt has had prior anesthetic. Type: General.    History of anesthetic complications   slow wake.    ROS/MED HX  ENT/Pulmonary:       Neurologic:  - neg neurologic ROS     Cardiovascular:       METS/Exercise Tolerance:     Hematologic:  - neg hematologic  ROS     Musculoskeletal:  - neg musculoskeletal ROS     GI/Hepatic:  - neg GI/hepatic ROS     Renal/Genitourinary:     (+) renal disease, type: CRI, Pt does not require dialysis,     Endo:  - neg endo ROS     Psychiatric/Substance Use:  - neg psychiatric ROS     Infectious Disease:  - neg infectious disease ROS     Malignancy:       Other:            Physical Exam    Airway        Mallampati: I   TM distance: > 3 FB      Respiratory Devices and Support         Dental  no notable dental history         Cardiovascular   cardiovascular exam normal          Pulmonary   pulmonary exam normal                 OUTSIDE LABS:  CBC:   Lab Results   Component Value Date    WBC 5.1 05/14/2021    WBC 3.9 (L) 02/26/2020    HGB 14.7 05/14/2021    HGB 13.8 02/26/2020    HCT 43.8 05/14/2021    HCT 41.2 02/26/2020     05/14/2021     02/26/2020     BMP:   Lab Results   Component Value Date     05/14/2021     02/26/2020    POTASSIUM 4.4 05/14/2021    POTASSIUM 4.2 02/26/2020    CHLORIDE 104 05/14/2021    CHLORIDE 105 02/26/2020    CO2 28 05/14/2021    CO2 26 02/26/2020    BUN 15 05/14/2021    BUN 14 02/26/2020    CR 1.16 (H) 05/14/2021    CR 1.12 (H) 02/26/2020    GLC 89 05/14/2021    GLC 79 02/26/2020     COAGS: No results found for: PTT, INR, FIBR  POC: No results found for: BGM, HCG, HCGS  HEPATIC:   Lab Results   Component Value Date    ALBUMIN 4.2 05/14/2021    PROTTOTAL 7.7 05/14/2021    ALT 36 05/14/2021    AST 23 05/14/2021    ALKPHOS 103 05/14/2021    BILITOTAL 1.4 (H) 05/14/2021     OTHER:   Lab Results   Component Value Date    KATIE 9.3 05/14/2021    TSH 1.04 05/14/2021       Anesthesia Plan    ASA Status:  3      Anesthesia Type: General.     - Airway: ETT   Induction: Intravenous.   Maintenance: Balanced.        Consents    Anesthesia Plan(s) and associated risks, benefits, and realistic alternatives discussed. Questions answered and patient/representative(s) expressed understanding.     - Discussed with:  Patient         Postoperative Care    Pain management: IV analgesics.   PONV prophylaxis: Ondansetron (or other 5HT-3), Dexamethasone or Solumedrol     Comments:                NATTY SAHU MD

## 2021-10-12 NOTE — INTERVAL H&P NOTE
I have reviewed the surgical (or preoperative) H&P that is linked to this encounter, and examined the patient. There are no significant changes.  Has not had any further gallstone attacks since she was last seen.  All questions answered regarding procedure.  Consent obtained.  To the OR for laparoscopic cholecystectomy.    Eneida Mon, DO  General Surgeon  [facility]  Surgery [facility] - [address] [address]?  Office: [phone]  Employed by - [facility]

## 2021-10-12 NOTE — ANESTHESIA CARE TRANSFER NOTE
Patient: Cristiana Padilla    Procedure: Procedure(s):  CHOLECYSTECTOMY, LAPAROSCOPIC       Diagnosis: Biliary colic [K80.50]  Diagnosis Additional Information: No value filed.    Anesthesia Type:   General     Note:    Oropharynx: oral airway in place and spontaneously breathing  Level of Consciousness: drowsy  Oxygen Supplementation: face mask  Level of Supplemental Oxygen (L/min / FiO2): 10  Independent Airway: airway patency satisfactory and stable  Dentition: dentition unchanged  Vital Signs Stable: post-procedure vital signs reviewed and stable  Report to RN Given: handoff report given  Patient transferred to: PACU    Handoff Report: Identifed the Patient, Identified the Reponsible Provider, Reviewed the pertinent medical history, Discussed the surgical course, Reviewed Intra-OP anesthesia mangement and issues during anesthesia, Set expectations for post-procedure period and Allowed opportunity for questions and acknowledgement of understanding      Vitals:  Vitals Value Taken Time   /67 10/12/21 1044   Temp     Pulse 77 10/12/21 1044   Resp 14 10/12/21 1044   SpO2 99 % 10/12/21 1044       Electronically Signed By: ANDREY WHITE CRNA  October 12, 2021  10:46 AM

## 2021-10-12 NOTE — ANESTHESIA POSTPROCEDURE EVALUATION
Patient: Cristiana Padilla    Procedure: Procedure(s):  CHOLECYSTECTOMY, LAPAROSCOPIC       Diagnosis:Biliary colic [K80.50]  Diagnosis Additional Information: No value filed.    Anesthesia Type:  General    Note:  Disposition: Outpatient   Postop Pain Control: Uneventful            Sign Out: Well controlled pain   PONV:    Neuro/Psych: Uneventful            Sign Out: Acceptable/Baseline neuro status   Airway/Respiratory: Uneventful            Sign Out: Acceptable/Baseline resp. status   CV/Hemodynamics: Uneventful            Sign Out: Acceptable CV status; No obvious hypovolemia; No obvious fluid overload   Other NRE: NONE   DID A NON-ROUTINE EVENT OCCUR? YES           Last vitals:  Vitals Value Taken Time   /65 10/12/21 1100   Temp     Pulse 70 10/12/21 1100   Resp 16 10/12/21 1100   SpO2 99 % 10/12/21 1100       Electronically Signed By: NATTY SAHU MD  October 12, 2021  11:11 AM

## 2021-10-12 NOTE — OP NOTE
Name:  Cristiana Padilla  PCP:  Gema Prajapati  Procedure Date:  10/12/2021      LAPAROSCOPIC CHOLECYSTECTOMY      Pre-Procedure Diagnosis:  Biliary colic     Post-Procedure Diagnosis:    Biliary colic  Chronic cholecystitis    Surgeon:  Eneida Mon DO    Anesthesia Type:    GET    Estimated Blood Loss:   5 cc    Specimens:    Gallbladder       Drains:   None    Complications:    None apparent    Indication for procedure:  This is a 61-year-old female who was diagnosed with cholelithiasis in 2019.  At that time it was incidental and asymptomatic.  Recently she developed right upper quadrant abdominal pain consistent with biliary colic.  She has elected for operative intervention for treatment.    Operative Report:    After informed consent was obtained, and the risks and benefits of the procedure were discussed, the patient was brought back to the operative suite and placed in the supine position.  General endotracheal anesthesia was administered and tolerated well.  A Time Out was held, and the abdomen was prepped and draped in the usual sterile fashion.  Local anesthetic agent was injected into the skin superior and lateral to the umbilicus and an incision made.  A 5mm optical trocar was placed under direct visualization.  Pneumoperitoneum was established to a pressure of 15 mm Hg.  After local infiltration, three more ports were then placed under direct vision in the epigastrium, right subcostal midclavicular line, and right subcostal mid-axillary line.  The gallbladder was identified, the fundus grasped and retracted cephalad. The gallbladder appeared to have a significantly thickened peritoneum and surrounding tissue, consistent with chronic cholecystitis.  The infundibulum was grasped and retracted laterally, exposing the peritoneum overlying the triangle of Calot. This was tediously dissected bluntly and with hook electrocautery until Calot's node and cystic artery were encountered first.  The  artery was skeletonized and freed enough that it could be triply clipped and cut.  This allowed for continued dissection until the cystic duct was clearly identified and freed circumferentially.  The junction of the gallbladder and cystic duct was clearly identified and clipped proximally with 3 large clips and on the gallbladder side with 1 clip.  The cystic duct was divided.  The gallbladder was dissected from the liver bed in retrograde fashion with the electrocautery.  There was a small amount of bile spillage during this time, but it was aspirated.  The gallbladder was removed with the assistance of an endocatch bag and extracted from the 11 mm trocar site.  Hemostasis was assured.  The fascia of the 11 mm trocar site was then closed with 0 Vicryl.  Pneumoperitoneum was reduced.  The trocars were removed. The skin was then closed with 4-0 Vicryl, followed by a sterile dressing.    Instrument, sponge, and needle counts were correct at closure and at the conclusion of the case.       Disposition:  The patient tolerated the procedure well.  They were transferred to the postanesthesia care unit in stable condition.        Eneida Mon DO  General Surgeon  [facility]  Surgery [address] [address]  Office: [phone]  Employed by - [facility] [employer] [facility]

## 2021-10-12 NOTE — ANESTHESIA PROCEDURE NOTES
Airway       Patient location during procedure: OR       Procedure Start/Stop Times: 10/12/2021 9:08 AM  Staff -        Anesthesiologist:  Ricardo Caruso MD       CRNA: Amy Fair APRN CRNA       Performed By: CRNA  Consent for Airway        Urgency: elective  Indications and Patient Condition       Indications for airway management: levar-procedural       Induction type:intravenous       Mask difficulty assessment: 2 - vent by mask + OA or adjuvant +/- NMBA    Final Airway Details       Final airway type: endotracheal airway       Successful airway: ETT - single and Oral  Endotracheal Airway Details        ETT size (mm): 7.0       Cuffed: yes       Successful intubation technique: direct laryngoscopy       DL Blade Type: MAC 3       Grade View of Cords: 1       Adjucts: stylet and tooth guard       Position: Right       Measured from: gums/teeth       Secured at (cm): 20       Bite block used: None    Post intubation assessment        Placement verified by: capnometry, equal breath sounds and chest rise        Number of attempts at approach: 1       Number of other approaches attempted: 0       Secured with: silk tape       Ease of procedure: easy       Dentition: Intact and Unchanged

## 2021-10-12 NOTE — DISCHARGE INSTRUCTIONS
You received 975 mg of acetaminophen (Tylenol) at 0855. Do not exceed 4,000 mg of acetaminophen during a 24 hour period and keep in mind that acetaminophen can also be found in many over-the-counter cold medications as well as narcotics that may be given for pain.

## 2021-10-15 ENCOUNTER — TELEPHONE (OUTPATIENT)
Dept: SURGERY | Facility: CLINIC | Age: 61
End: 2021-10-15

## 2021-10-19 NOTE — TELEPHONE ENCOUNTER
Left message for patient. Sent information via [employer].    Blank Kamara RN  [facility]  [phone]

## 2021-11-12 ENCOUNTER — ANCILLARY PROCEDURE (OUTPATIENT)
Dept: MAMMOGRAPHY | Facility: CLINIC | Age: 61
End: 2021-11-12
Attending: FAMILY MEDICINE
Payer: COMMERCIAL

## 2021-11-12 DIAGNOSIS — Z12.31 VISIT FOR SCREENING MAMMOGRAM: ICD-10-CM

## 2021-11-12 PROCEDURE — 77063 BREAST TOMOSYNTHESIS BI: CPT

## 2022-01-18 VITALS
RESPIRATION RATE: 14 BRPM | DIASTOLIC BLOOD PRESSURE: 75 MMHG | WEIGHT: 174 LBS | HEART RATE: 63 BPM | HEIGHT: 61 IN | BODY MASS INDEX: 32.85 KG/M2 | SYSTOLIC BLOOD PRESSURE: 112 MMHG

## 2022-01-18 VITALS
HEART RATE: 69 BPM | SYSTOLIC BLOOD PRESSURE: 115 MMHG | HEIGHT: 61 IN | DIASTOLIC BLOOD PRESSURE: 78 MMHG | BODY MASS INDEX: 31.15 KG/M2 | WEIGHT: 165 LBS | RESPIRATION RATE: 16 BRPM

## 2022-05-16 DIAGNOSIS — J30.89 NON-SEASONAL ALLERGIC RHINITIS DUE TO OTHER ALLERGIC TRIGGER: Primary | ICD-10-CM

## 2022-05-17 RX ORDER — LORATADINE 10 MG/1
TABLET ORAL
Qty: 30 TABLET | Refills: 3 | Status: SHIPPED | OUTPATIENT
Start: 2022-05-17 | End: 2022-09-28

## 2022-05-17 NOTE — TELEPHONE ENCOUNTER
"Routing refill request to provider for review/approval because:  Drug not active on patient's medication list    Last Written Prescription Date:    Last Fill Quantity: ,  # refills:    Last office visit provider:  5/14/21     Requested Prescriptions   Pending Prescriptions Disp Refills     loratadine (CLARITIN) 10 MG tablet [Pharmacy Med Name: LORATADINE 10 MG TABS 10 Tablet] 30 tablet 3     Sig: TAKE 1 TABLET (10 MG TOTAL) BY MOUTH DAILY.       Antihistamines Protocol Failed - 5/16/2022  5:10 PM        Failed - Medication is active on med list        Passed - Patient is 3-64 years of age     Apply weight-based dosing for peds patients age 3 - 12 years of age.    Forward request to provider for patients under the age of 3 or over the age of 64.          Passed - Recent (12 mo) or future (30 days) visit within the authorizing provider's specialty     Patient has had an office visit with the authorizing provider or a provider within the authorizing providers department within the previous 12 mos or has a future within next 30 days. See \"Patient Info\" tab in inbasket, or \"Choose Columns\" in Meds & Orders section of the refill encounter.                   Angeli Gallo RN 05/17/22 9:34 AM  "

## 2022-07-23 ENCOUNTER — HEALTH MAINTENANCE LETTER (OUTPATIENT)
Age: 62
End: 2022-07-23

## 2022-09-01 ENCOUNTER — MEDICAL CORRESPONDENCE (OUTPATIENT)
Dept: HEALTH INFORMATION MANAGEMENT | Facility: CLINIC | Age: 62
End: 2022-09-01

## 2022-09-28 ENCOUNTER — OFFICE VISIT (OUTPATIENT)
Dept: FAMILY MEDICINE | Facility: CLINIC | Age: 62
End: 2022-09-28
Payer: COMMERCIAL

## 2022-09-28 VITALS
BODY MASS INDEX: 31.48 KG/M2 | HEART RATE: 69 BPM | HEIGHT: 61 IN | TEMPERATURE: 97.1 F | DIASTOLIC BLOOD PRESSURE: 87 MMHG | WEIGHT: 166.75 LBS | SYSTOLIC BLOOD PRESSURE: 131 MMHG

## 2022-09-28 DIAGNOSIS — Z13.220 SCREENING FOR HYPERLIPIDEMIA: ICD-10-CM

## 2022-09-28 DIAGNOSIS — E55.9 VITAMIN D DEFICIENCY: ICD-10-CM

## 2022-09-28 DIAGNOSIS — Z23 NEED FOR INFLUENZA VACCINATION: ICD-10-CM

## 2022-09-28 DIAGNOSIS — N18.31 STAGE 3A CHRONIC KIDNEY DISEASE (H): ICD-10-CM

## 2022-09-28 DIAGNOSIS — Z00.00 ADULT GENERAL MEDICAL EXAM: Primary | ICD-10-CM

## 2022-09-28 DIAGNOSIS — D50.8 OTHER IRON DEFICIENCY ANEMIA: ICD-10-CM

## 2022-09-28 PROBLEM — K57.30 DIVERTICULAR DISEASE OF LARGE INTESTINE: Status: ACTIVE | Noted: 2021-05-13

## 2022-09-28 LAB
ALBUMIN SERPL BCG-MCNC: 4.5 G/DL (ref 3.5–5.2)
ALP SERPL-CCNC: 91 U/L (ref 35–104)
ALT SERPL W P-5'-P-CCNC: 31 U/L (ref 10–35)
ANION GAP SERPL CALCULATED.3IONS-SCNC: 13 MMOL/L (ref 7–15)
AST SERPL W P-5'-P-CCNC: 32 U/L (ref 10–35)
BILIRUB SERPL-MCNC: 1.1 MG/DL
BUN SERPL-MCNC: 15 MG/DL (ref 8–23)
CALCIUM SERPL-MCNC: 9.7 MG/DL (ref 8.8–10.2)
CHLORIDE SERPL-SCNC: 102 MMOL/L (ref 98–107)
CHOLEST SERPL-MCNC: 239 MG/DL
CREAT SERPL-MCNC: 1.03 MG/DL (ref 0.51–0.95)
CREAT UR-MCNC: 232 MG/DL
DEPRECATED HCO3 PLAS-SCNC: 26 MMOL/L (ref 22–29)
ERYTHROCYTE [DISTWIDTH] IN BLOOD BY AUTOMATED COUNT: 12.4 % (ref 10–15)
GFR SERPL CREATININE-BSD FRML MDRD: 61 ML/MIN/1.73M2
GLUCOSE SERPL-MCNC: 81 MG/DL (ref 70–99)
HCT VFR BLD AUTO: 40.6 % (ref 35–47)
HDLC SERPL-MCNC: 49 MG/DL
HGB BLD-MCNC: 14 G/DL (ref 11.7–15.7)
IRON SERPL-MCNC: 83 UG/DL (ref 37–145)
LDLC SERPL CALC-MCNC: 152 MG/DL
MCH RBC QN AUTO: 30.4 PG (ref 26.5–33)
MCHC RBC AUTO-ENTMCNC: 34.5 G/DL (ref 31.5–36.5)
MCV RBC AUTO: 88 FL (ref 78–100)
MICROALBUMIN UR-MCNC: <12 MG/L
MICROALBUMIN/CREAT UR: NORMAL MG/G{CREAT}
NONHDLC SERPL-MCNC: 190 MG/DL
PLATELET # BLD AUTO: 220 10E3/UL (ref 150–450)
POTASSIUM SERPL-SCNC: 3.8 MMOL/L (ref 3.4–5.3)
PROT SERPL-MCNC: 7.8 G/DL (ref 6.4–8.3)
RBC # BLD AUTO: 4.61 10E6/UL (ref 3.8–5.2)
SODIUM SERPL-SCNC: 141 MMOL/L (ref 136–145)
TRIGL SERPL-MCNC: 188 MG/DL
WBC # BLD AUTO: 5.8 10E3/UL (ref 4–11)

## 2022-09-28 PROCEDURE — 82306 VITAMIN D 25 HYDROXY: CPT | Performed by: FAMILY MEDICINE

## 2022-09-28 PROCEDURE — 36415 COLL VENOUS BLD VENIPUNCTURE: CPT | Performed by: FAMILY MEDICINE

## 2022-09-28 PROCEDURE — 80061 LIPID PANEL: CPT | Performed by: FAMILY MEDICINE

## 2022-09-28 PROCEDURE — 90682 RIV4 VACC RECOMBINANT DNA IM: CPT | Performed by: FAMILY MEDICINE

## 2022-09-28 PROCEDURE — 85027 COMPLETE CBC AUTOMATED: CPT | Performed by: FAMILY MEDICINE

## 2022-09-28 PROCEDURE — 82043 UR ALBUMIN QUANTITATIVE: CPT | Performed by: FAMILY MEDICINE

## 2022-09-28 PROCEDURE — 99396 PREV VISIT EST AGE 40-64: CPT | Mod: 25 | Performed by: FAMILY MEDICINE

## 2022-09-28 PROCEDURE — 83540 ASSAY OF IRON: CPT | Performed by: FAMILY MEDICINE

## 2022-09-28 PROCEDURE — 80053 COMPREHEN METABOLIC PANEL: CPT | Performed by: FAMILY MEDICINE

## 2022-09-28 PROCEDURE — 90471 IMMUNIZATION ADMIN: CPT | Performed by: FAMILY MEDICINE

## 2022-09-28 RX ORDER — LORATADINE 10 MG/1
10 TABLET ORAL
COMMUNITY
Start: 2022-05-17 | End: 2022-09-28

## 2022-09-28 ASSESSMENT — ENCOUNTER SYMPTOMS
NAUSEA: 0
PARESTHESIAS: 0
ARTHRALGIAS: 0
JOINT SWELLING: 0
DIARRHEA: 0
HEMATURIA: 0
PALPITATIONS: 0
FREQUENCY: 0
HEADACHES: 0
CHILLS: 0
HEMATOCHEZIA: 0
COUGH: 0
DYSURIA: 0
FEVER: 0
DIZZINESS: 0
NERVOUS/ANXIOUS: 0
EYE PAIN: 0
HEARTBURN: 0
MYALGIAS: 0
SORE THROAT: 0
CONSTIPATION: 0
WEAKNESS: 0
SHORTNESS OF BREATH: 0
ABDOMINAL PAIN: 0

## 2022-09-28 NOTE — PROGRESS NOTES
SUBJECTIVE:   CC: Cristiana is an 62 year old who presents for preventive health visit.         Working on exercise - getting 10,000 steps/day       Healthy Habits:     Getting at least 3 servings of Calcium per day:  NO    Bi-annual eye exam:  Yes    Dental care twice a year:  Yes    Sleep apnea or symptoms of sleep apnea:  None    Diet:  Regular (no restrictions)    Frequency of exercise:  4-5 days/week    Duration of exercise:  15-30 minutes    Taking medications regularly:  Yes    Medication side effects:  None    PHQ-2 Total Score: 0    Additional concerns today:  No              Today's PHQ-2 Score:   PHQ-2 ( 1999 Pfizer) 9/28/2022   Q1: Little interest or pleasure in doing things 0   Q2: Feeling down, depressed or hopeless 0   PHQ-2 Score 0   Q1: Little interest or pleasure in doing things Not at all   Q2: Feeling down, depressed or hopeless Not at all   PHQ-2 Score 0       Abuse: Current or Past (Physical, Sexual or Emotional) - No  Do you feel safe in your environment? Yes    Have you ever done Advance Care Planning? (For example, a Health Directive, POLST, or a discussion with a medical provider or your loved ones about your wishes): No, advance care planning information given to patient to review.  Patient declined advance care planning discussion at this time.    Social History     Tobacco Use     Smoking status: Never Smoker     Smokeless tobacco: Never Used   Substance Use Topics     Alcohol use: No     If you drink alcohol do you typically have >3 drinks per day or >7 drinks per week? No    Alcohol Use 9/28/2022   Prescreen: >3 drinks/day or >7 drinks/week? No   Prescreen: >3 drinks/day or >7 drinks/week? -   No flowsheet data found.    Reviewed orders with patient.  Reviewed health maintenance and updated orders accordingly - Yes  BP Readings from Last 3 Encounters:   09/28/22 131/87   10/12/21 114/68   09/23/21 132/80    Wt Readings from Last 3 Encounters:   09/28/22 75.6 kg (166 lb 12 oz)   10/12/21  74.8 kg (165 lb)   09/23/21 76.5 kg (168 lb 11.2 oz)                  Patient Active Problem List   Diagnosis     Vertigo     Hyperplastic colon polyp     Obesity (BMI 30.0-34.9)     Iron deficiency anemia     Calculus of gallbladder without cholecystitis without obstruction     Stage 3a chronic kidney disease (H)     Biliary colic     Diverticular disease of large intestine     Past Surgical History:   Procedure Laterality Date     COLONOSCOPY       LAPAROSCOPIC CHOLECYSTECTOMY N/A 10/12/2021    Procedure: CHOLECYSTECTOMY, LAPAROSCOPIC;  Surgeon: Eneida Mon DO;  Location: [facility]     WISDOM TOOTH EXTRACTION         Social History     Tobacco Use     Smoking status: Never Smoker     Smokeless tobacco: Never Used   Substance Use Topics     Alcohol use: No     Family History   Problem Relation Age of Onset     Clotting Disorder Father         had Waldenstroms macroglobulinemia     Colon Cancer Mother 62.00     Aortic aneurysm Mother      Liver Disease Sister 56.00     Alcoholism Maternal Grandfather          Current Outpatient Medications   Medication Sig Dispense Refill     cholecalciferol 25 MCG (1000 UT) TABS Take 1 tablet by mouth       multivitamin (ONE-DAILY) tablet        Allergies   Allergen Reactions     Cat Dander [Animal Dander] Itching     Cat Hair Extract Itching     Dog Dander [Dog Epithelium] Itching     Gramineae Pollens      Grass Pollen [Grass] Itching     Mold        Breast Cancer Screening:    FHS-7:   Breast CA Risk Assessment (FHS-7) 11/12/2021 9/28/2022   Did any of your first-degree relatives have breast or ovarian cancer? No No   Did any of your relatives have bilateral breast cancer? No No   Did any man in your family have breast cancer? No No   Did any woman in your family have breast and ovarian cancer? No No   Did any woman in your family have breast cancer before age 50 y? No No   Do you have 2 or more relatives with breast and/or ovarian cancer? No No   Do you have 2 or  more relatives with breast and/or bowel cancer? No No       Mammogram Screening: Recommended mammography every 1-2 years with patient discussion and risk factor consideration  Pertinent mammograms are reviewed under the imaging tab.    History of abnormal Pap smear: NO - age 30-65 PAP every 5 years with negative HPV co-testing recommended  PAP / HPV Latest Ref Rng & Units 2019   PAP Negative for squamous intraepithelial lesion or malignancy. Negative for squamous intraepithelial lesion or malignancy  Electronically signed by Doreen Hall CT (ASCP) on 2019 at 10:29 AM     HPV16 NEG Negative   HPV18 NEG Negative   HRHPV NEG Negative     Reviewed and updated as needed this visit by clinical staff   Tobacco  Allergies                 Reviewed and updated as needed this visit by Provider                   Past Medical History:   Diagnosis Date     Acute sinusitis, unspecified     Created by Conversion      Complication of anesthesia     Slow to wake after [place]     Disturbance of skin sensation     Created by Conversion      Erosion of teeth, generalized     Created by Conversion      Menopause age 53     Other specified psychophysiological malfunction     Created by Conversion       Past Surgical History:   Procedure Laterality Date     COLONOSCOPY       LAPAROSCOPIC CHOLECYSTECTOMY N/A 10/12/2021    Procedure: CHOLECYSTECTOMY, LAPAROSCOPIC;  Surgeon: Eneida Mon DO;  Location: [facility]     WISDOM TOOTH EXTRACTION       OB History    Para Term  AB Living   5 4 4 0 1 0   SAB IAB Ectopic Multiple Live Births   1 0 0 0 0      # Outcome Date GA Lbr Chava/2nd Weight Sex Delivery Anes PTL Lv   5 Term            4 Term            3 Term            2 Term            1 SAB                Review of Systems   Constitutional: Negative for chills and fever.   HENT: Positive for hearing loss. Negative for congestion, ear pain and sore throat.    Eyes: Negative for pain and visual  "disturbance.   Respiratory: Negative for cough and shortness of breath.    Cardiovascular: Negative for chest pain, palpitations and peripheral edema.   Gastrointestinal: Negative for abdominal pain, constipation, diarrhea, heartburn, hematochezia and nausea.   Genitourinary: Negative for dysuria, frequency, genital sores, hematuria and urgency.   Musculoskeletal: Negative for arthralgias, joint swelling and myalgias.   Skin: Negative for rash.   Neurological: Negative for dizziness, weakness, headaches and paresthesias.   Psychiatric/Behavioral: Negative for mood changes. The patient is not nervous/anxious.           OBJECTIVE:   /87 (BP Location: Right arm, Patient Position: Sitting, Cuff Size: Adult Regular)   Pulse 69   Temp 97.1  F (36.2  C)   Ht 1.546 m (5' 0.87\")   Wt 75.6 kg (166 lb 12 oz)   BMI 31.65 kg/m    Physical Exam  Vitals reviewed.   Constitutional:       General: She is not in acute distress.     Appearance: She is well-developed.   HENT:      Right Ear: Tympanic membrane and external ear normal.      Left Ear: Tympanic membrane and external ear normal.      Nose: Nose normal.      Mouth/Throat:      Pharynx: No oropharyngeal exudate.   Eyes:      General:         Right eye: No discharge.         Left eye: No discharge.      Conjunctiva/sclera: Conjunctivae normal.      Pupils: Pupils are equal, round, and reactive to light.   Neck:      Thyroid: No thyromegaly.      Trachea: No tracheal deviation.   Cardiovascular:      Rate and Rhythm: Normal rate and regular rhythm.      Pulses: Normal pulses.      Heart sounds: Normal heart sounds, S1 normal and S2 normal. No murmur heard.    No friction rub. No S3 or S4 sounds.   Pulmonary:      Effort: Pulmonary effort is normal. No respiratory distress.      Breath sounds: Normal breath sounds. No wheezing or rales.   Chest:   Breasts:      Right: No mass, nipple discharge or tenderness.      Left: No mass, nipple discharge or tenderness. "       Abdominal:      General: Bowel sounds are normal.      Palpations: Abdomen is soft. There is no mass.      Tenderness: There is no abdominal tenderness.   Musculoskeletal:         General: Normal range of motion.      Cervical back: Neck supple.   Lymphadenopathy:      Cervical: No cervical adenopathy.   Skin:     General: Skin is warm and dry.      Findings: No rash.   Neurological:      Mental Status: She is alert and oriented to person, place, and time.      Motor: No abnormal muscle tone.      Deep Tendon Reflexes: Reflexes are normal and symmetric.   Psychiatric:         Thought Content: Thought content normal.         Judgment: Judgment normal.           Diagnostic Test Results:  Labs reviewed in Epic  Results for orders placed or performed in visit on 09/28/22   Lipid Profile (Chol, Trig, HDL, LDL calc)     Status: Abnormal   Result Value Ref Range    Cholesterol 239 (H) <200 mg/dL    Triglycerides 188 (H) <150 mg/dL    Direct Measure HDL 49 (L) >=50 mg/dL    LDL Cholesterol Calculated 152 (H) <=100 mg/dL    Non HDL Cholesterol 190 (H) <130 mg/dL    Narrative    Cholesterol  Desirable:  <200 mg/dL    Triglycerides  Normal:  Less than 150 mg/dL  Borderline High:  150-199 mg/dL  High:  200-499 mg/dL  Very High:  Greater than or equal to 500 mg/dL    Direct Measure HDL  Female:  Greater than or equal to 50 mg/dL   Male:  Greater than or equal to 40 mg/dL    LDL Cholesterol  Desirable:  <100mg/dL  Above Desirable:  100-129 mg/dL   Borderline High:  130-159 mg/dL   High:  160-189 mg/dL   Very High:  >= 190 mg/dL    Non HDL Cholesterol  Desirable:  130 mg/dL  Above Desirable:  130-159 mg/dL  Borderline High:  160-189 mg/dL  High:  190-219 mg/dL  Very High:  Greater than or equal to 220 mg/dL   Comprehensive metabolic panel (BMP + Alb, Alk Phos, ALT, AST, Total. Bili, TP)     Status: Abnormal   Result Value Ref Range    Sodium 141 136 - 145 mmol/L    Potassium 3.8 3.4 - 5.3 mmol/L    Chloride 102 98 - 107  mmol/L    Carbon Dioxide (CO2) 26 22 - 29 mmol/L    Anion Gap 13 7 - 15 mmol/L    Urea Nitrogen 15.0 8.0 - 23.0 mg/dL    Creatinine 1.03 (H) 0.51 - 0.95 mg/dL    Calcium 9.7 8.8 - 10.2 mg/dL    Glucose 81 70 - 99 mg/dL    Alkaline Phosphatase 91 35 - 104 U/L    AST 32 10 - 35 U/L    ALT 31 10 - 35 U/L    Protein Total 7.8 6.4 - 8.3 g/dL    Albumin 4.5 3.5 - 5.2 g/dL    Bilirubin Total 1.1 <=1.2 mg/dL    GFR Estimate 61 >60 mL/min/1.73m2   CBC with platelets     Status: Normal   Result Value Ref Range    WBC Count 5.8 4.0 - 11.0 10e3/uL    RBC Count 4.61 3.80 - 5.20 10e6/uL    Hemoglobin 14.0 11.7 - 15.7 g/dL    Hematocrit 40.6 35.0 - 47.0 %    MCV 88 78 - 100 fL    MCH 30.4 26.5 - 33.0 pg    MCHC 34.5 31.5 - 36.5 g/dL    RDW 12.4 10.0 - 15.0 %    Platelet Count 220 150 - 450 10e3/uL   Iron     Status: Normal   Result Value Ref Range    Iron 83 37 - 145 ug/dL   Vitamin D Deficiency     Status: Normal   Result Value Ref Range    Vitamin D, Total (25-Hydroxy) 54 20 - 75 ug/L    Narrative    Season, race, dietary intake, and treatment affect the concentration of 25-hydroxy-Vitamin D. Values may decrease during winter months and increase during summer months. Values 20-29 ug/L may indicate Vitamin D insufficiency and values <20 ug/L may indicate Vitamin D deficiency.    Vitamin D determination is routinely performed by an immunoassay specific for 25 hydroxyvitamin D3.  If an individual is on vitamin D2(ergocalciferol) supplementation, please specify 25 OH vitamin D2 and D3 level determination by LCMSMS test VITD23.     Albumin Random Urine Quantitative with Creat Ratio     Status: None   Result Value Ref Range    Albumin Urine mg/L <12.0 mg/L    Albumin Urine mg/g Cr      Creatinine Urine mg/dL 232.0 mg/dL       ASSESSMENT/PLAN:   1. Adult general medical exam  This is a 63 yo female here for physical exam   - REVIEW OF HEALTH MAINTENANCE PROTOCOL ORDERS  - Lipid Profile (Chol, Trig, HDL, LDL calc); Future  - Lipid  "Profile (Chol, Trig, HDL, LDL calc)    2. Stage 3a chronic kidney disease (H)  H/o stage 3a chronic kidney disease - check labs - no uremic symptoms  - Albumin Random Urine Quantitative with Creat Ratio; Future  - Comprehensive metabolic panel (BMP + Alb, Alk Phos, ALT, AST, Total. Bili, TP); Future  - CBC with platelets; Future  - Comprehensive metabolic panel (BMP + Alb, Alk Phos, ALT, AST, Total. Bili, TP)  - CBC with platelets  - Albumin Random Urine Quantitative with Creat Ratio    3. Other iron deficiency anemia  H/o iron deficiency anemia- check iron levels   - Iron; Future  - Iron    4. Vitamin D deficiency  H/o vitamin D deficiency - check levels  - Vitamin D Deficiency; Future  - Vitamin D Deficiency    5. Screening for hyperlipidemia  Due for lipid screening     6. Need for influenza vaccination  Due for seasonal flu vaccine - ordered  - INFLUENZA QUAD, RECOMBINANT, P-FREE (RIV4) (FLUBLOK) AGE 50-64 [ZAU450]          COUNSELING:  Reviewed preventive health counseling, as reflected in patient instructions       Regular exercise       Healthy diet/nutrition    Estimated body mass index is 31.65 kg/m  as calculated from the following:    Height as of this encounter: 1.546 m (5' 0.87\").    Weight as of this encounter: 75.6 kg (166 lb 12 oz).    Weight management plan: Discussed healthy diet and exercise guidelines    She reports that she has never smoked. She has never used smokeless tobacco.      Counseling Resources:  ATP IV Guidelines  Pooled Cohorts Equation Calculator  Breast Cancer Risk Calculator  BRCA-Related Cancer Risk Assessment: FHS-7 Tool  FRAX Risk Assessment  ICSI Preventive Guidelines  Dietary Guidelines for Americans, 2010  USDA's MyPlate  ASA Prophylaxis  Lung CA Screening    RADHA LOFTON MD  [facility]  "

## 2022-09-29 LAB — DEPRECATED CALCIDIOL+CALCIFEROL SERPL-MC: 54 UG/L (ref 20–75)

## 2022-11-16 ENCOUNTER — ANCILLARY PROCEDURE (OUTPATIENT)
Dept: MAMMOGRAPHY | Facility: CLINIC | Age: 62
End: 2022-11-16
Attending: FAMILY MEDICINE
Payer: COMMERCIAL

## 2022-11-16 DIAGNOSIS — Z12.31 VISIT FOR SCREENING MAMMOGRAM: ICD-10-CM

## 2022-11-16 PROCEDURE — 77067 SCR MAMMO BI INCL CAD: CPT

## 2023-01-24 ENCOUNTER — TELEPHONE (OUTPATIENT)
Dept: FAMILY MEDICINE | Facility: CLINIC | Age: 63
End: 2023-01-24
Payer: COMMERCIAL

## 2023-01-24 NOTE — TELEPHONE ENCOUNTER
FYI - Status Update    Who is Calling: patient    Update: Pt has a toenail great toe left foot fungus and is losing the nail per [facility].  Pt has been using Kerasol and taking Tylenol PM for pain but it is not working/    Pt declined referral to podiatry.  Pt is asking for pain relief.  Can PCP please prescribe something for pain?  Thanks    Does caller want a call/response back: Yes     Could we send this information to you in [employer] or would you prefer to receive a phone call?:   Patient would prefer a phone call     Okay to leave a detailed message?: Yes at Cell number on file:    Telephone Information:   Mobile [phone]     Call taken on 1/24/23 at 1 pm by SAMUEL Goncalves

## 2023-02-02 ENCOUNTER — TELEPHONE (OUTPATIENT)
Dept: FAMILY MEDICINE | Facility: CLINIC | Age: 63
End: 2023-02-02
Payer: COMMERCIAL

## 2023-02-02 DIAGNOSIS — L98.9 SKIN LESION: Primary | ICD-10-CM

## 2023-02-02 NOTE — TELEPHONE ENCOUNTER
General Call      Reason for Call:  Referral    What are your questions or concerns:  Pt called in to request a referral for dermatology. Please look into this request and place order if applicable.  Thanks!     Date of last appointment with provider: 9/28/22    Could we send this information to you in [employer] or would you prefer to receive a phone call?:   Patient would prefer a phone call   Okay to leave a detailed message?: Yes at Home number on file [phone] (home)

## 2023-02-03 NOTE — TELEPHONE ENCOUNTER
"Called pt unable to leave message#1 Please see why patient is requesting referral for dermatology  \" Okay to relay message\"    "

## 2023-02-07 NOTE — TELEPHONE ENCOUNTER
The pt stated that she has a spot on her face that she originally thought was an age spot that has been getting very dry and flaky and wants it looked at.     She thinks it has been getting dry and flaky for the past 9 months.

## 2023-03-24 ENCOUNTER — TRANSFERRED RECORDS (OUTPATIENT)
Dept: HEALTH INFORMATION MANAGEMENT | Facility: CLINIC | Age: 63
End: 2023-03-24

## 2023-09-29 NOTE — TELEPHONE ENCOUNTER
Cristiana called and asked to have Dr. Mon call her.  She would like to know how the surgery went that she had on 10/12.    Thanks!   Rhombic Flap Text: The defect edges were debeveled with a #15 scalpel blade.  Given the location of the defect and the proximity to free margins a rhombic flap was deemed most appropriate.  Using a sterile surgical marker, an appropriate rhombic flap was drawn incorporating the defect.    The area thus outlined was incised deep to adipose tissue with a #15 scalpel blade.  The skin margins were undermined to an appropriate distance in all directions utilizing iris scissors.

## 2023-11-08 ASSESSMENT — ENCOUNTER SYMPTOMS
PARESTHESIAS: 0
ARTHRALGIAS: 0
NAUSEA: 0
FEVER: 0
MYALGIAS: 0
DYSURIA: 0
DIZZINESS: 0
BREAST MASS: 0
DIARRHEA: 0
PALPITATIONS: 0
FREQUENCY: 0
CHILLS: 0
HEMATOCHEZIA: 0
ABDOMINAL PAIN: 0
WEAKNESS: 0
SORE THROAT: 0
HEARTBURN: 0
HEMATURIA: 0
COUGH: 0
NERVOUS/ANXIOUS: 0
JOINT SWELLING: 0
HEADACHES: 0
CONSTIPATION: 0
SHORTNESS OF BREATH: 0
EYE PAIN: 0

## 2023-11-08 NOTE — COMMUNITY RESOURCES LIST (ENGLISH)
11/08/2023   [facility]  N/A  For questions about this resource list or additional care needs, please contact your primary care clinic or care manager.  Phone: [phone]   Email: N/A   Address: [address] [place] [address]   Hours: N/A        Hotlines and Helplines       Hotline - Housing crisis  1  [facility] Distance: 9.23 miles      Phone/Virtual   [address] [place] [address]  Language: English  Hours: Mon - Sun Open 24 Hours   Phone: (993) 996-2408 Email: communications@[facility].org Website: https://[facility]-[place].org/oursaviourshousing/     2  [facility] Distance: 10.79 miles      Phone/Virtual   [address] [place] [address]  Language: English  Hours: Mon - Sun Open 24 Hours   Phone: (922) 877-6326 Email: info@[facility].org Website: http://www.[facility].org          Housing       Coordinated Entry access point  3  [facility] Distance: 4.1 miles      In-Person, Phone/Virtual   [address] [place] [address]  Language: English, Nauruan  Hours: Mon - Fri 8:30 AM - 4:30 PM  Fees: Free   Phone: (479) 682-1510 Email: info@[facility].org Website: https://www.[facility].org/locations/downP[facility]-clinic/     4  [facility] - Coordinated Access to Housing and Shelter (CAHS) - Coordinated Access Distance: 4.84 miles      In-Person, Phone/Virtual   [address] [place] [address]  Language: English  Hours: Mon - Fri 8:00 AM - 4:30 PM  Fees: Free   Phone: (677) 525-6318 Website: https://www.[facility]./residents/assistance-support/assistance/housing-services-support     Drop-in center or day shelter  5  [facility] Distance: 3.2 miles      In-Person   [address] [place] [address]  Language: English  Hours: Mon - Fri 9:00 AM - 4:00 PM  Fees: Free   Phone: (397) 434-6757 Email: frontgusk@[employer].org Website:  http://[facility].org     6  [facility] [place] [facility] Distance: 9.12 miles      In-Person   [address] [place] [address]  Language: English, Bulgarian, Prydeinig  Hours: Mon - Sat 7:00 AM - 3:00 PM  Fees: Free, Self Pay   Phone: (566) 773-1323 Email: info@[employer] Website: https://www.[employer]/locations/opportunity-center/     Housing search assistance  7  [facility] - Housing Search Assistance Distance: 5.13 miles      Phone/Virtual   [address] [place] [address]  Language: Faroese, English, Hmong, Mee, Bulgarian, Prydeinig  Hours: Mon - Fri Appt. Only  Fees: Free   Phone: (948) 787-5172 Website: https://[employer].Space[employer]/     8  [facility] [place] - Mental Health Crisis Housing Search Assistance Distance: 5.73 miles      In-Person, Phone/Virtual   [address] [facility] [address] [place] [address]  Language: English  Hours: Mon - Tue 8:00 AM - 4:30 PM , Wed 8:00 AM - 6:00 PM , Thu - Fri 8:00 AM - 4:30 PM  Fees: Free   Phone: (947) 596-8448 Email: [facility]. Website: https://www.[facility]./residents/health-medical/clinics-services/mental-health/adult-mental-health     Shelter for families  9  [facility] Distance: 11.45 miles      In-Person   [address] [place] [address]  Language: English  Hours: Mon - Fri 3:00 PM - 9:00 AM , Sat - Sun Open 24 Hours  Fees: Free   Phone: (719) 381-2093 Ext.1 Website: https://www.[facility].org/2020/07/03/emergency-family-shelter/     Shelter for individuals  10  [facility] [place] [facility] [place] [facility] - [facility] [place] [facility] Distance: 4.09 miles      In-Person   [address] [place] [address]  Language: English  Hours: Mon - Sun 5:00 PM - 10:00 AM  Fees: Free, Self Pay   Phone: (229) 257-2552 Email: info@[employer].org Website:  https://www.[facility].org/locations/[facility]/     11  [facility] Distance: 9.23 miles      In-Person   [address] [place] [address]  Language: English  Hours: Mon - Sun Open 24 Hours  Fees: Free   Phone: (473) 469-4321 Email: communications@[facility].org Website: https://[facility].org/oursaviourshousing/          Important Numbers & Websites       Emergency Services   [address]  [place] [address]  Poison Control   (753) 993-6962  Suicide Prevention Lifeline   (250) 983-3221 (TALK)  Child Abuse Hotline   (748) 354-7420 (4-A-Child)  Sexual Assault Hotline   (122) 978-4650 (HOPE)  National Runaway Safeline   (465) 211-1417 (RUNAWAY)  All-Options Talkline   (611) 462-2183  Substance Abuse Referral   (101) 477-8592 (HELP)

## 2023-11-15 ENCOUNTER — OFFICE VISIT (OUTPATIENT)
Dept: FAMILY MEDICINE | Facility: CLINIC | Age: 63
End: 2023-11-15
Payer: COMMERCIAL

## 2023-11-15 VITALS
HEART RATE: 69 BPM | RESPIRATION RATE: 18 BRPM | OXYGEN SATURATION: 96 % | BODY MASS INDEX: 32.1 KG/M2 | WEIGHT: 170 LBS | DIASTOLIC BLOOD PRESSURE: 80 MMHG | SYSTOLIC BLOOD PRESSURE: 115 MMHG | TEMPERATURE: 97.8 F | HEIGHT: 61 IN

## 2023-11-15 DIAGNOSIS — Z23 NEED FOR IMMUNIZATION AGAINST INFLUENZA: ICD-10-CM

## 2023-11-15 DIAGNOSIS — Z12.4 CERVICAL CANCER SCREENING: ICD-10-CM

## 2023-11-15 DIAGNOSIS — Z00.00 ADULT GENERAL MEDICAL EXAM: Primary | ICD-10-CM

## 2023-11-15 DIAGNOSIS — N18.31 STAGE 3A CHRONIC KIDNEY DISEASE (H): ICD-10-CM

## 2023-11-15 DIAGNOSIS — E78.2 MIXED HYPERLIPIDEMIA: Primary | ICD-10-CM

## 2023-11-15 LAB
ALBUMIN SERPL BCG-MCNC: 4.4 G/DL (ref 3.5–5.2)
ALP SERPL-CCNC: 89 U/L (ref 40–150)
ALT SERPL W P-5'-P-CCNC: 33 U/L (ref 0–50)
ANION GAP SERPL CALCULATED.3IONS-SCNC: 11 MMOL/L (ref 7–15)
AST SERPL W P-5'-P-CCNC: 35 U/L (ref 0–45)
BILIRUB SERPL-MCNC: 0.9 MG/DL
BUN SERPL-MCNC: 18.8 MG/DL (ref 8–23)
CALCIUM SERPL-MCNC: 9.6 MG/DL (ref 8.8–10.2)
CHLORIDE SERPL-SCNC: 105 MMOL/L (ref 98–107)
CHOLEST SERPL-MCNC: 249 MG/DL
CREAT SERPL-MCNC: 1.04 MG/DL (ref 0.51–0.95)
CREAT UR-MCNC: 165 MG/DL
DEPRECATED HCO3 PLAS-SCNC: 27 MMOL/L (ref 22–29)
EGFRCR SERPLBLD CKD-EPI 2021: 60 ML/MIN/1.73M2
GLUCOSE SERPL-MCNC: 88 MG/DL (ref 70–99)
HDLC SERPL-MCNC: 50 MG/DL
HGB BLD-MCNC: 13.8 G/DL (ref 11.7–15.7)
LDLC SERPL CALC-MCNC: 163 MG/DL
MICROALBUMIN UR-MCNC: <12 MG/L
MICROALBUMIN/CREAT UR: NORMAL MG/G{CREAT}
NONHDLC SERPL-MCNC: 199 MG/DL
POTASSIUM SERPL-SCNC: 4.4 MMOL/L (ref 3.4–5.3)
PROT SERPL-MCNC: 7.7 G/DL (ref 6.4–8.3)
SODIUM SERPL-SCNC: 143 MMOL/L (ref 135–145)
TRIGL SERPL-MCNC: 182 MG/DL

## 2023-11-15 PROCEDURE — 99396 PREV VISIT EST AGE 40-64: CPT | Mod: 25 | Performed by: FAMILY MEDICINE

## 2023-11-15 PROCEDURE — 90686 IIV4 VACC NO PRSV 0.5 ML IM: CPT | Performed by: FAMILY MEDICINE

## 2023-11-15 PROCEDURE — 90471 IMMUNIZATION ADMIN: CPT | Performed by: FAMILY MEDICINE

## 2023-11-15 PROCEDURE — 80061 LIPID PANEL: CPT | Performed by: FAMILY MEDICINE

## 2023-11-15 PROCEDURE — 87624 HPV HI-RISK TYP POOLED RSLT: CPT | Performed by: FAMILY MEDICINE

## 2023-11-15 PROCEDURE — G0145 SCR C/V CYTO,THINLAYER,RESCR: HCPCS | Performed by: FAMILY MEDICINE

## 2023-11-15 PROCEDURE — 85018 HEMOGLOBIN: CPT | Performed by: FAMILY MEDICINE

## 2023-11-15 PROCEDURE — 80053 COMPREHEN METABOLIC PANEL: CPT | Performed by: FAMILY MEDICINE

## 2023-11-15 PROCEDURE — 82570 ASSAY OF URINE CREATININE: CPT | Performed by: FAMILY MEDICINE

## 2023-11-15 PROCEDURE — 82043 UR ALBUMIN QUANTITATIVE: CPT | Performed by: FAMILY MEDICINE

## 2023-11-15 PROCEDURE — 36415 COLL VENOUS BLD VENIPUNCTURE: CPT | Performed by: FAMILY MEDICINE

## 2023-11-15 RX ORDER — ATORVASTATIN CALCIUM 20 MG/1
20 TABLET, FILM COATED ORAL DAILY
Qty: 90 TABLET | Refills: 1 | Status: SHIPPED | OUTPATIENT
Start: 2023-11-15

## 2023-11-15 RX ORDER — LORATADINE 10 MG/1
10 TABLET ORAL DAILY PRN
COMMUNITY

## 2023-11-15 ASSESSMENT — ENCOUNTER SYMPTOMS
DIZZINESS: 0
SHORTNESS OF BREATH: 0
BREAST MASS: 0
SORE THROAT: 0
MYALGIAS: 0
PALPITATIONS: 0
DYSURIA: 0
NERVOUS/ANXIOUS: 0
JOINT SWELLING: 0
FEVER: 0
WEAKNESS: 0
COUGH: 0
HEARTBURN: 0
HEMATOCHEZIA: 0
CONSTIPATION: 0
EYE PAIN: 0
NAUSEA: 0
HEADACHES: 0
ABDOMINAL PAIN: 0
CHILLS: 0
FREQUENCY: 0
ARTHRALGIAS: 0
HEMATURIA: 0
PARESTHESIAS: 0
DIARRHEA: 0

## 2023-11-15 NOTE — COMMUNITY RESOURCES LIST (ENGLISH)
11/15/2023   [facility]  [facility]  For questions about this resource list or additional care needs, please contact your primary care clinic or care manager.  Phone: [phone]   Email: N/A   Address: [address] [place] [address]   Hours: N/A        Hotlines and Helplines       Hotline - Housing crisis  1  [facility] Distance: 9.23 miles      Phone/Virtual   [address] [place] [address]  Language: English  Hours: Mon - Sun Open 24 Hours   Phone: (568) 795-2460 Email: communications@[facility].org Website: https://[facility]-[place].org/oursaviourshousing/     2  [facility] Distance: 10.79 miles      Phone/Virtual   [address] [place] [address]  Language: English  Hours: Mon - Sun Open 24 Hours   Phone: (275) 636-8585 Email: info@[facility].org Website: http://www.[facility].org          Housing       Coordinated Entry access point  3  [facility] Distance: 4.1 miles      In-Person, Phone/Virtual   [address] [place] [address]  Language: English, Turks and [place] Islander  Hours: Mon - Fri 8:30 AM - 4:30 PM  Fees: Free   Phone: (622) 173-9821 Email: info@[facility].org Website: https://www.[facility].org/locations/down[facility]-clinic/     4  [facility] - Coordinated Access to Housing and Shelter (CAHS) - Coordinated Access Distance: 4.84 miles      In-Person, Phone/Virtual   [address] [place] [address]  Language: English  Hours: Mon - Fri 8:00 AM - 4:30 PM  Fees: Free   Phone: (410) 635-4040 Website: https://www.[facility]./residents/assistance-support/assistance/housing-services-support     Drop-in center or day shelter  5  [facility] Distance: 3.2 miles      In-Person   [address] [place] [address]  Language: English  Hours: Mon - Fri 9:00 AM - 4:00 PM  Fees: Free   Phone: (472) 120-5518 Email: frontgusk@[facility].org Website:  http://[facility] Hospital[facility].com.org     6  [facility] [place] [facility] Distance: 9.12 miles      In-Person   [address] [place] [address]  Language: English, Yemeni, Turkish  Hours: Mon - Sat 7:00 AM - 3:00 PM  Fees: Free, Self Pay   Phone: (869) 653-7668 Email: info@[employer] Website: https://www.[employer]/locations/opportunity-center/     Housing search assistance  7  [facility] - Housing Search Assistance Distance: 5.13 miles      Phone/Virtual   [address] [place] [address]  Language: Urdu, English, Hmong, Mee, Yemeni, Turkish  Hours: Mon - Fri Appt. Only  Fees: Free   Phone: (926) 603-3744 Website: https://[employer]/     8  [facility] [place] - Mental Health Crisis Housing Search Assistance Distance: 5.73 miles      In-Person, Phone/Virtual   [address] [facility] [address] [place] [address]  Language: English  Hours: Mon - Tue 8:00 AM - 4:30 PM , Wed 8:00 AM - 6:00 PM , Thu - Fri 8:00 AM - 4:30 PM  Fees: Free   Phone: (870) 953-9847 Email: [facility]. Website: https://www.[facility]./residents/health-medical/clinics-services/mental-health/adult-mental-health     Shelter for families  9  [facility] Distance: 11.45 miles      In-Person   [address] [place] [address]  Language: English  Hours: Mon - Fri 3:00 PM - 9:00 AM , Sat - Sun Open 24 Hours  Fees: Free   Phone: (798) 252-2214 Ext.1 Website: https://www.[facility].org/2020/07/03/emergency-family-shelter/     Shelter for individuals  10  [facility] [place] [facility] [place] [facility] - [facility] [place] [facility] Distance: 4.09 miles      In-Person   [address] [place] [address]  Language: English  Hours: Mon - Sun 5:00 PM - 10:00 AM  Fees: Free, Self Pay   Phone: (900) 991-2502 Email: info@[employer].org Website:  https://www.[facility].org/locations/[facility]/     11  [facility] Distance: 9.23 miles      In-Person   [address] [place] [address]  Language: English  Hours: Mon - Sun Open 24 Hours  Fees: Free   Phone: (937) 218-8522 Email: communications@[facility].org Website: https://[facility].org/oursaviourshousing/          Important Numbers & Websites       Emergency Services   [address]  [place] [address]  Poison Control   (680) 517-5544  Suicide Prevention Lifeline   (584) 968-1559 (TALK)  Child Abuse Hotline   (954) 835-9823 (4-A-Child)  Sexual Assault Hotline   (403) 922-7091 (HOPE)  National Runaway Safeline   (420) 859-8321 (RUNAWAY)  All-Options Talkline   (390) 535-3611  Substance Abuse Referral   (185) 783-5325 (HELP)

## 2023-11-15 NOTE — PROGRESS NOTES
SUBJECTIVE:   Cristiana is a 63 year old, presenting for the following:  Physical        11/15/2023     7:54 AM   Additional Questions   Roomed by Danitza       Walking a mile every morning during week - before work   Retires in 8 months -     Had basal cell ca - nose -   Follows with derm   [facility]       Healthy Habits:     Getting at least 3 servings of Calcium per day:  NO    Bi-annual eye exam:  Yes    Dental care twice a year:  Yes    Sleep apnea or symptoms of sleep apnea:  Daytime drowsiness    Diet:  Regular (no restrictions)    Frequency of exercise:  6-7 days/week    Duration of exercise:  15-30 minutes    Taking medications regularly:  Yes    Medication side effects:  Not applicable      Today's PHQ-2 Score:       11/15/2023     7:47 AM   PHQ-2 ( 1999 [employer])   Q1: Little interest or pleasure in doing things 0   Q2: Feeling down, depressed or hopeless 0   PHQ-2 Score 0   Q1: Little interest or pleasure in doing things Not at all   Q2: Feeling down, depressed or hopeless Not at all   PHQ-2 Score 0               Social History     Tobacco Use    Smoking status: Never    Smokeless tobacco: Never   Substance Use Topics    Alcohol use: No             11/8/2023     3:22 PM   Alcohol Use   Prescreen: >3 drinks/day or >7 drinks/week? No     Reviewed orders with patient.  Reviewed health maintenance and updated orders accordingly - Yes  BP Readings from Last 3 Encounters:   11/15/23 115/80   09/28/22 131/87   10/12/21 114/68    Wt Readings from Last 3 Encounters:   11/15/23 77.1 kg (170 lb)   09/28/22 75.6 kg (166 lb 12 oz)   10/12/21 74.8 kg (165 lb)                  Patient Active Problem List   Diagnosis    Vertigo    Hyperplastic colon polyp    Obesity (BMI 30.0-34.9)    Iron deficiency anemia    Calculus of gallbladder without cholecystitis without obstruction    Stage 3a chronic kidney disease (H)    Biliary colic    Diverticular disease of large intestine     Past Surgical History:   Procedure  Laterality Date    COLONOSCOPY      LAPAROSCOPIC CHOLECYSTECTOMY N/A 10/12/2021    Procedure: CHOLECYSTECTOMY, LAPAROSCOPIC;  Surgeon: Eneida Mon DO;  Location: [facility]    WISDOM TOOTH EXTRACTION         Social History     Tobacco Use    Smoking status: Never    Smokeless tobacco: Never   Substance Use Topics    Alcohol use: No     Family History   Problem Relation Age of Onset    Clotting Disorder Father         had Waldenstroms macroglobulinemia    Colon Cancer Mother 62.00    Aortic aneurysm Mother     Liver Disease Sister 56.00    Alcoholism Maternal Grandfather          Current Outpatient Medications   Medication Sig Dispense Refill    cholecalciferol 25 MCG (1000 UT) TABS Take 1 tablet by mouth 3 times a week      loratadine (CLARITIN) 10 MG tablet Take 10 mg by mouth daily as needed for allergies      multivitamin (ONE-DAILY) tablet Take 1 tablet by mouth daily      atorvastatin (LIPITOR) 20 MG tablet Take 1 tablet (20 mg) by mouth daily 90 tablet 1     Allergies   Allergen Reactions    Cat Dander [Animal Dander] Itching    Cat Hair Extract Itching    Dog Dander [Dog Epithelium Allergy Skin Test] Itching    Gramineae Pollens     Grass Pollen [Grass] Itching    Mold        Breast Cancer Screening:    FHS-7:       11/12/2021     4:02 PM 9/28/2022     4:19 PM 11/16/2022     7:44 AM 11/8/2023     3:24 PM   Breast CA Risk Assessment (FHS-7)   Did any of your first-degree relatives have breast or ovarian cancer? No No No No   Did any of your relatives have bilateral breast cancer? No No No No   Did any man in your family have breast cancer? No No No No   Did any woman in your family have breast and ovarian cancer? No No No No   Did any woman in your family have breast cancer before age 50 y? No No No No   Do you have 2 or more relatives with breast and/or ovarian cancer? No No No No   Do you have 2 or more relatives with breast and/or bowel cancer? No No No No       Mammogram Screening: Recommended  mammography every 1-2 years with patient discussion and risk factor consideration  Pertinent mammograms are reviewed under the imaging tab.    History of abnormal Pap smear: NO - age 30-65 PAP every 5 years with negative HPV co-testing recommended      Latest Ref Rng & Units 11/15/2023     8:40 AM 2019     8:35 AM   PAP / HPV   PAP  Negative for Intraepithelial Lesion or Malignancy (NILM)  Negative for squamous intraepithelial lesion or malignancy  Electronically signed by Doreen Hall CT (ASCP) on 2019 at 10:29 AM      HPV 16 DNA NEG  Negative    HPV 18 DNA NEG  Negative    Other HR HPV NEG  Negative      Reviewed and updated as needed this visit by clinical staff   Tobacco  Allergies  Meds              Reviewed and updated as needed this visit by Provider                 Past Medical History:   Diagnosis Date    Acute sinusitis, unspecified     Created by Conversion     Complication of anesthesia     Slow to wake after [place]    Disturbance of skin sensation     Created by Conversion     Erosion of teeth, generalized     Created by Conversion     Menopause age 53    Other specified psychophysiological malfunction     Created by Conversion       Past Surgical History:   Procedure Laterality Date    COLONOSCOPY      LAPAROSCOPIC CHOLECYSTECTOMY N/A 10/12/2021    Procedure: CHOLECYSTECTOMY, LAPAROSCOPIC;  Surgeon: Eneida Mon DO;  Location: [facility]    WISDOM TOOTH EXTRACTION       OB History    Para Term  AB Living   5 4 4 0 1 0   SAB IAB Ectopic Multiple Live Births   1 0 0 0 0      # Outcome Date GA Lbr Chava/2nd Weight Sex Delivery Anes PTL Lv   5 Term            4 Term            3 Term            2 Term            1 SAB                Review of Systems   Constitutional:  Negative for chills and fever.   HENT:  Positive for congestion and hearing loss. Negative for ear pain and sore throat.    Eyes:  Negative for pain and visual disturbance.   Respiratory:  Negative  "for cough and shortness of breath.    Cardiovascular:  Negative for chest pain, palpitations and peripheral edema.   Gastrointestinal:  Negative for abdominal pain, constipation, diarrhea, heartburn, hematochezia and nausea.   Breasts:  Negative for tenderness, breast mass and discharge.   Genitourinary:  Negative for dysuria, frequency, genital sores, hematuria, pelvic pain, urgency, vaginal bleeding and vaginal discharge.   Musculoskeletal:  Negative for arthralgias, joint swelling and myalgias.   Skin:  Negative for rash.   Neurological:  Negative for dizziness, weakness, headaches and paresthesias.   Psychiatric/Behavioral:  Negative for mood changes. The patient is not nervous/anxious.           OBJECTIVE:   /80 (BP Location: Right arm, Patient Position: Sitting, Cuff Size: Adult Regular)   Pulse 69   Temp 97.8  F (36.6  C) (Temporal)   Resp 18   Ht 1.549 m (5' 1\")   Wt 77.1 kg (170 lb)   LMP  (LMP Unknown)   SpO2 96%   BMI 32.12 kg/m    Physical Exam  Vitals reviewed.   Constitutional:       General: She is not in acute distress.     Appearance: Normal appearance.   HENT:      Head: Normocephalic.      Right Ear: Tympanic membrane, ear canal and external ear normal.      Left Ear: Tympanic membrane, ear canal and external ear normal.      Nose: Nose normal.      Mouth/Throat:      Mouth: Mucous membranes are moist.      Pharynx: No posterior oropharyngeal erythema.   Eyes:      Extraocular Movements: Extraocular movements intact.      Conjunctiva/sclera: Conjunctivae normal.      Pupils: Pupils are equal, round, and reactive to light.   Cardiovascular:      Rate and Rhythm: Normal rate and regular rhythm.      Pulses: Normal pulses.      Heart sounds: Normal heart sounds. No murmur heard.  Pulmonary:      Effort: Pulmonary effort is normal.      Breath sounds: Normal breath sounds.   Abdominal:      Palpations: Abdomen is soft. There is no mass.      Tenderness: There is no abdominal tenderness. " There is no guarding or rebound.   Genitourinary:     Comments: PELVIC EXAM:External genitalia: normal  Vaginal mucosa normal  Vaginal discharge: none  Speculum exam shows a normal appearing cervix .   Bimanual exam: Cervix closed, firm, non tender  to motion.  Uterus  firm, regular, mobile, non tender to palpation. No adnexal masses or tenderness.     Musculoskeletal:         General: No deformity. Normal range of motion.      Cervical back: Normal range of motion and neck supple.   Lymphadenopathy:      Cervical: No cervical adenopathy.   Skin:     General: Skin is warm and dry.   Neurological:      General: No focal deficit present.      Mental Status: She is alert.   Psychiatric:         Mood and Affect: Mood normal.         Behavior: Behavior normal.           Diagnostic Test Results:  Labs reviewed in Epic  Results for orders placed or performed in visit on 11/15/23   Lipid panel reflex to direct LDL Non-fasting     Status: Abnormal   Result Value Ref Range    Cholesterol 249 (H) <200 mg/dL    Triglycerides 182 (H) <150 mg/dL    Direct Measure HDL 50 >=50 mg/dL    LDL Cholesterol Calculated 163 (H) <=100 mg/dL    Non HDL Cholesterol 199 (H) <130 mg/dL    Narrative    Cholesterol  Desirable:  <200 mg/dL    Triglycerides  Normal:  Less than 150 mg/dL  Borderline High:  150-199 mg/dL  High:  200-499 mg/dL  Very High:  Greater than or equal to 500 mg/dL    Direct Measure HDL  Female:  Greater than or equal to 50 mg/dL   Male:  Greater than or equal to 40 mg/dL    LDL Cholesterol  Desirable:  <100mg/dL  Above Desirable:  100-129 mg/dL   Borderline High:  130-159 mg/dL   High:  160-189 mg/dL   Very High:  >= 190 mg/dL    Non HDL Cholesterol  Desirable:  130 mg/dL  Above Desirable:  130-159 mg/dL  Borderline High:  160-189 mg/dL  High:  190-219 mg/dL  Very High:  Greater than or equal to 220 mg/dL   Hemoglobin     Status: Normal   Result Value Ref Range    Hemoglobin 13.8 11.7 - 15.7 g/dL   Comprehensive metabolic  panel (BMP + Alb, Alk Phos, ALT, AST, Total. Bili, TP)     Status: Abnormal   Result Value Ref Range    Sodium 143 135 - 145 mmol/L    Potassium 4.4 3.4 - 5.3 mmol/L    Carbon Dioxide (CO2) 27 22 - 29 mmol/L    Anion Gap 11 7 - 15 mmol/L    Urea Nitrogen 18.8 8.0 - 23.0 mg/dL    Creatinine 1.04 (H) 0.51 - 0.95 mg/dL    GFR Estimate 60 (L) >60 mL/min/1.73m2    Calcium 9.6 8.8 - 10.2 mg/dL    Chloride 105 98 - 107 mmol/L    Glucose 88 70 - 99 mg/dL    Alkaline Phosphatase 89 40 - 150 U/L    AST 35 0 - 45 U/L    ALT 33 0 - 50 U/L    Protein Total 7.7 6.4 - 8.3 g/dL    Albumin 4.4 3.5 - 5.2 g/dL    Bilirubin Total 0.9 <=1.2 mg/dL   Albumin Random Urine Quantitative with Creat Ratio     Status: None   Result Value Ref Range    Creatinine Urine mg/dL 165.0 mg/dL    Albumin Urine mg/L <12.0 mg/L    Albumin Urine mg/g Cr     Pap Screen with HPV - recommended age 30 - 65 years     Status: None   Result Value Ref Range    Interpretation        Negative for Intraepithelial Lesion or Malignancy (NILM)    Comment         Papanicolaou Test Limitations:  Cervical cytology is a screening test with limited sensitivity, and regular screening is critical for cancer prevention.  Pap tests are primarily effective for the diagnosis/prevention of squamous cell carcinoma, not adenocarcinoma or other cancers.        Specimen Adequacy       Satisfactory for evaluation, endocervical/transformation zone component present    Clinical Information       none      Reflex Testing Yes regardless of result     Previous Abnormal?       No      Performing Labs       The technical component of this testing was completed at [facility]         ASSESSMENT/PLAN:   1. Adult general medical exam  This is a 64 yo female here for physical exam -     2. Stage 3a chronic kidney disease (H)  H/o stage 3a chronic kidney disease - no uremic symptoms - has been very stable.   - Lipid panel reflex to direct  LDL Non-fasting; Future  - Albumin Random Urine Quantitative with Creat Ratio; Future  - Hemoglobin; Future  - Comprehensive metabolic panel (BMP + Alb, Alk Phos, ALT, AST, Total. Bili, TP); Future  - Lipid panel reflex to direct LDL Non-fasting  - Hemoglobin  - Comprehensive metabolic panel (BMP + Alb, Alk Phos, ALT, AST, Total. Bili, TP)  - Albumin Random Urine Quantitative with Creat Ratio    3. Cervical cancer screening  Due for cervix cancer screening - pap/HPV done/sent.  If normal, will discontinue screening  - Pap Screen with HPV - recommended age 30 - 65 years  - HPV Hold (Lab Only)    4. Need for immunization against influenza  Due for seasonal flu vaccine - discussed - ordered.   - INFLUENZA VACCINE >6 MONTHS (AFLURIA/FLUZONE)            COUNSELING:  Reviewed preventive health counseling, as reflected in patient instructions       Regular exercise       Healthy diet/nutrition        She reports that she has never smoked. She has never used smokeless tobacco.        RADHA LOFTON MD  [facility]    Prior to immunization administration, verified patients identity using patient s name and date of birth. Please see Immunization Activity for additional information.     Screening Questionnaire for Adult Immunization    Are you sick today?   No   Do you have allergies to medications, food, a vaccine component or latex?   No   Have you ever had a serious reaction after receiving a vaccination?   No   Do you have a long-term health problem with heart, lung, kidney, or metabolic disease (e.g., diabetes), asthma, a blood disorder, no spleen, complement component deficiency, a cochlear implant, or a spinal fluid leak?  Are you on long-term aspirin therapy?   No   Do you have cancer, leukemia, HIV/AIDS, or any other immune system problem?   No   Do you have a parent, brother, or sister with an immune system problem?   No   In the past 3 months, have you taken medications that  affect  your immune system, such as prednisone, other steroids, or anticancer drugs; drugs for the treatment of rheumatoid arthritis, Crohn s disease, or psoriasis; or have you had radiation treatments?   No   Have you had a seizure, or a brain or other nervous system problem?   No   During the past year, have you received a transfusion of blood or blood    products, or been given immune (gamma) globulin or antiviral drug?   No   For women: Are you pregnant or is there a chance you could become       pregnant during the next month?   No   Have you received any vaccinations in the past 4 weeks?   No     Immunization questionnaire answers were all negative.      Patient instructed to remain in clinic for 15 minutes afterwards, and to report any adverse reactions.     Screening performed by Danitza Duong CMA on 11/15/2023 at 7:56 AM.

## 2023-11-15 NOTE — COMMUNITY RESOURCES LIST (ENGLISH)
11/15/2023   [facility] - Outpatient Clinics  N/A  For additional resource needs, please contact your health insurance member services or your primary care team.  Phone: [phone]   Email: N/A   Address: [facility] [place] [address]   Hours: N/A        Hotlines and Helplines       Hotline - Housing crisis  1  [facility] Distance: 9.23 miles      Phone/Virtual   [address] [place] [address]  Language: English  Hours: Mon - Sun Open 24 Hours   Phone: (466) 194-5620 Email: communications@[facility].org Website: https://[facility].org/oursaviourshousing/     2  [facility] Distance: 10.79 miles      Phone/Virtual   [address] [place] [address]  Language: English  Hours: Mon - Sun Open 24 Hours   Phone: (869) 489-9913 Email: info@[facility] Website: http://www.[facility].org          Housing       Coordinated Entry access point  3  [facility] Distance: 4.1 miles      In-Person, Phone/Virtual   [address] [place] [address]  Language: English, Danish  Hours: Mon - Fri 8:30 AM - 4:30 PM  Fees: Free   Phone: (318) 947-7744 Email: info@[facility].org Website: https://www.[facility].org/locations/[facility]cl[facility]/     4  [facility] Coordinated Access to Housing and Shelter ([facility]) - Coordinated Access Distance: 4.84 miles      In-Person, Phone/Virtual   [address] [place] [address]  Language: English  Hours: Mon - Fri 8:00 AM - 4:30 PM  Fees: Free   Phone: (301) 187-8246 Website: https://www.[facility]./residents/assistance-support/assistance/housing-services-support     Drop-in center or day shelter  5  [facility] Distance: 3.2 miles      In-Person   [address] [place] [address]  Language: English  Hours: Mon - Fri 9:00 AM - 4:00 PM  Fees: Free   Phone: (130) 829-3939 Email: mary@listeninghouse.org Website: http://[employer].org      6  [facility] [place] [facility] Distance: 9.12 miles      In-Person   [address] [place] [address]  Language: English, Cameroonian, Taiwanese  Hours: Mon - Sat 7:00 AM - 3:00 PM  Fees: Free, Self Pay   Phone: (157) 771-3673 Email: info@[employer] Website: https://www.[employer]/locations/opportunity-center/     Housing search assistance  7  [employer] - https://[employer]/ Distance: 9.05 miles      Phone/Virtual   [address] [place] [address]  Language: English  Hours: Mon - Sun Open 24 Hours   Email: info@[employer] Website: https://[employer]     8  [employer] - Online housing search assistance Distance: 10.14 miles      Phone/Virtual   [address] [address]  Language: English, Hmong, Cameroonian, Taiwanese  Hours: Mon - Sun Open 24 Hours   Phone: (306) 882-7485 Email: info@[employer].org Website: http://www.[employer].org/     Shelter for families  9  [facility] Distance: 11.45 miles      In-Person   [address] [place] [address]  Language: English  Hours: Mon - Fri 3:00 PM - 9:00 AM , Sat - Sun Open 24 Hours  Fees: Free   Phone: (763) 402-2253 Ext.1 Website: https://www.[facility].org/2020/07/03/emergency-family-shelter/     Shelter for individuals  10  [facility] [place] [facility] [place] [facility] [place] [facility] Distance: 4.09 miles      In-Person   [address] [place] [address]  Language: English  Hours: Mon - Sun 5:00 PM - 10:00 AM  Fees: Free, Self Pay   Phone: (969) 385-6842 Email: info@[employer] Website: https://www.[employer]/locations/[facility]/     11  [facility] Distance: 9.23 miles      In-Person   [address] [place] [address]  Language: English  Hours: Mon - Sun Open 24 Hours  Fees: Free   Phone: (840) 491-1323  Email: communications@[facility].org Website: https://[facility].org/oursaviourshousing/          Important Numbers & Websites       [place] [address] 211itedway.org  Poison Control   (932) 489-9802 [place]  Suicide and Crisis Lifeline   982 988Critical access hospitalline.org  [facility]help [place] Child Abuse Hotline   571.516.3470 Childhelphotline.org  [place] Sexual Assault Hotline   (446) 176-8421 (HOPE) [facility] Runaway Safeline   (900) 105-4047 (RUNAWAY) 1800runaway.org  Pregnancy & Postpartum Support [place]   Call/text 598-401-7302 Ppsupportmn.org  Substance Abuse National Helpline ([facility]   629-137-HELP (5220) Findtreatment.gov  Emergency Services   919

## 2023-11-17 LAB
BKR LAB AP GYN ADEQUACY: NORMAL
BKR LAB AP GYN INTERPRETATION: NORMAL
BKR LAB AP HPV REFLEX: NORMAL
BKR LAB AP PREVIOUS ABNORMAL: NORMAL
PATH REPORT.COMMENTS IMP SPEC: NORMAL
PATH REPORT.COMMENTS IMP SPEC: NORMAL
PATH REPORT.RELEVANT HX SPEC: NORMAL

## 2023-11-21 LAB
HUMAN PAPILLOMA VIRUS 16 DNA: NEGATIVE
HUMAN PAPILLOMA VIRUS 18 DNA: NEGATIVE
HUMAN PAPILLOMA VIRUS FINAL DIAGNOSIS: NORMAL
HUMAN PAPILLOMA VIRUS OTHER HR: NEGATIVE

## 2023-11-28 ENCOUNTER — ANCILLARY PROCEDURE (OUTPATIENT)
Dept: MAMMOGRAPHY | Facility: CLINIC | Age: 63
End: 2023-11-28
Attending: FAMILY MEDICINE
Payer: COMMERCIAL

## 2023-11-28 DIAGNOSIS — Z12.31 VISIT FOR SCREENING MAMMOGRAM: ICD-10-CM

## 2023-11-28 PROCEDURE — 77067 SCR MAMMO BI INCL CAD: CPT

## 2024-03-21 DIAGNOSIS — E78.2 MIXED HYPERLIPIDEMIA: ICD-10-CM

## 2024-03-21 RX ORDER — ATORVASTATIN CALCIUM 20 MG/1
20 TABLET, FILM COATED ORAL DAILY
Qty: 90 TABLET | Refills: 1 | Status: CANCELLED | OUTPATIENT
Start: 2024-03-21

## 2024-03-21 NOTE — TELEPHONE ENCOUNTER
Called and left detailed message directing patient to contact pharmacy as she still has a refill remaining on her prescription.

## 2024-03-21 NOTE — TELEPHONE ENCOUNTER
Medication Question or Refill        What medication are you calling about (include dose and sig)?: atorvastatin (LIPITOR) 20 MG tablet     Preferred Pharmacy:   [address] [address]  Phone: [phone] Fax: [phone]      Controlled Substance Agreement on file:   CSA -- Patient Level:    CSA: None found at the patient level.       Who prescribed the medication?: PCP    Do you need a refill? Yes, pt called in to request a refill on medication, please look into this request and send to pharmacy if applicable. Pt would like a call to let her know if approved or not.  Thanks!    When did you use the medication last? N/A    Patient offered an appointment? No    Do you have any questions or concerns?  No      Could we send this information to you in [facility] or would you prefer to receive a phone call?:   Patient would prefer a phone call   Okay to leave a detailed message?: Yes at Home number on file [phone] (home)

## 2024-11-18 ENCOUNTER — OFFICE VISIT (OUTPATIENT)
Dept: FAMILY MEDICINE | Facility: CLINIC | Age: 64
End: 2024-11-18
Payer: COMMERCIAL

## 2024-11-18 VITALS
WEIGHT: 168 LBS | OXYGEN SATURATION: 97 % | BODY MASS INDEX: 31.72 KG/M2 | DIASTOLIC BLOOD PRESSURE: 79 MMHG | HEIGHT: 61 IN | SYSTOLIC BLOOD PRESSURE: 118 MMHG | HEART RATE: 63 BPM | TEMPERATURE: 96.8 F

## 2024-11-18 DIAGNOSIS — N18.31 STAGE 3A CHRONIC KIDNEY DISEASE (H): ICD-10-CM

## 2024-11-18 DIAGNOSIS — D50.8 OTHER IRON DEFICIENCY ANEMIA: ICD-10-CM

## 2024-11-18 DIAGNOSIS — Z00.00 ENCOUNTER FOR MEDICARE ANNUAL WELLNESS EXAM: Primary | ICD-10-CM

## 2024-11-18 DIAGNOSIS — Z23 NEED FOR IMMUNIZATION AGAINST INFLUENZA: ICD-10-CM

## 2024-11-18 DIAGNOSIS — Z85.828 HISTORY OF BASAL CELL CARCINOMA: ICD-10-CM

## 2024-11-18 DIAGNOSIS — E78.2 MIXED HYPERLIPIDEMIA: ICD-10-CM

## 2024-11-18 DIAGNOSIS — Z23 NEED FOR PNEUMOCOCCAL 20-VALENT CONJUGATE VACCINATION: ICD-10-CM

## 2024-11-18 DIAGNOSIS — Z78.0 MENOPAUSE: ICD-10-CM

## 2024-11-18 LAB
ALBUMIN SERPL BCG-MCNC: 4.3 G/DL (ref 3.5–5.2)
ALP SERPL-CCNC: 93 U/L (ref 40–150)
ALT SERPL W P-5'-P-CCNC: 23 U/L (ref 0–50)
ANION GAP SERPL CALCULATED.3IONS-SCNC: 11 MMOL/L (ref 7–15)
AST SERPL W P-5'-P-CCNC: 30 U/L (ref 0–45)
BASOPHILS # BLD AUTO: 0.1 10E3/UL (ref 0–0.2)
BASOPHILS NFR BLD AUTO: 1 %
BILIRUB SERPL-MCNC: 0.8 MG/DL
BUN SERPL-MCNC: 11.3 MG/DL (ref 8–23)
CALCIUM SERPL-MCNC: 9.6 MG/DL (ref 8.8–10.4)
CHLORIDE SERPL-SCNC: 105 MMOL/L (ref 98–107)
CHOLEST SERPL-MCNC: 231 MG/DL
CREAT SERPL-MCNC: 1.08 MG/DL (ref 0.51–0.95)
CREAT UR-MCNC: 149 MG/DL
EGFRCR SERPLBLD CKD-EPI 2021: 57 ML/MIN/1.73M2
EOSINOPHIL # BLD AUTO: 0.2 10E3/UL (ref 0–0.7)
EOSINOPHIL NFR BLD AUTO: 5 %
ERYTHROCYTE [DISTWIDTH] IN BLOOD BY AUTOMATED COUNT: 12.1 % (ref 10–15)
FASTING STATUS PATIENT QL REPORTED: YES
FASTING STATUS PATIENT QL REPORTED: YES
FERRITIN SERPL-MCNC: 96 NG/ML (ref 11–328)
GLUCOSE SERPL-MCNC: 90 MG/DL (ref 70–99)
HCO3 SERPL-SCNC: 26 MMOL/L (ref 22–29)
HCT VFR BLD AUTO: 40.5 % (ref 35–47)
HDLC SERPL-MCNC: 46 MG/DL
HGB BLD-MCNC: 13.7 G/DL (ref 11.7–15.7)
IMM GRANULOCYTES # BLD: 0 10E3/UL
IMM GRANULOCYTES NFR BLD: 0 %
IRON BINDING CAPACITY (ROCHE): 273 UG/DL (ref 240–430)
IRON SATN MFR SERPL: 29 % (ref 15–46)
IRON SERPL-MCNC: 80 UG/DL (ref 37–145)
LDLC SERPL CALC-MCNC: 149 MG/DL
LYMPHOCYTES # BLD AUTO: 1.8 10E3/UL (ref 0.8–5.3)
LYMPHOCYTES NFR BLD AUTO: 38 %
MCH RBC QN AUTO: 29.6 PG (ref 26.5–33)
MCHC RBC AUTO-ENTMCNC: 33.8 G/DL (ref 31.5–36.5)
MCV RBC AUTO: 88 FL (ref 78–100)
MICROALBUMIN UR-MCNC: <12 MG/L
MICROALBUMIN/CREAT UR: NORMAL MG/G{CREAT}
MONOCYTES # BLD AUTO: 0.4 10E3/UL (ref 0–1.3)
MONOCYTES NFR BLD AUTO: 9 %
NEUTROPHILS # BLD AUTO: 2.2 10E3/UL (ref 1.6–8.3)
NEUTROPHILS NFR BLD AUTO: 46 %
NONHDLC SERPL-MCNC: 185 MG/DL
PLATELET # BLD AUTO: 217 10E3/UL (ref 150–450)
POTASSIUM SERPL-SCNC: 4 MMOL/L (ref 3.4–5.3)
PROT SERPL-MCNC: 7.8 G/DL (ref 6.4–8.3)
RBC # BLD AUTO: 4.63 10E6/UL (ref 3.8–5.2)
SODIUM SERPL-SCNC: 142 MMOL/L (ref 135–145)
TRIGL SERPL-MCNC: 182 MG/DL
WBC # BLD AUTO: 4.7 10E3/UL (ref 4–11)

## 2024-11-18 PROCEDURE — 90677 PCV20 VACCINE IM: CPT | Performed by: FAMILY MEDICINE

## 2024-11-18 PROCEDURE — G0008 ADMIN INFLUENZA VIRUS VAC: HCPCS | Performed by: FAMILY MEDICINE

## 2024-11-18 PROCEDURE — 36415 COLL VENOUS BLD VENIPUNCTURE: CPT | Performed by: FAMILY MEDICINE

## 2024-11-18 PROCEDURE — 82728 ASSAY OF FERRITIN: CPT | Performed by: FAMILY MEDICINE

## 2024-11-18 PROCEDURE — 80061 LIPID PANEL: CPT | Performed by: FAMILY MEDICINE

## 2024-11-18 PROCEDURE — G0439 PPPS, SUBSEQ VISIT: HCPCS | Performed by: FAMILY MEDICINE

## 2024-11-18 PROCEDURE — 83540 ASSAY OF IRON: CPT | Performed by: FAMILY MEDICINE

## 2024-11-18 PROCEDURE — G0009 ADMIN PNEUMOCOCCAL VACCINE: HCPCS | Performed by: FAMILY MEDICINE

## 2024-11-18 PROCEDURE — 82043 UR ALBUMIN QUANTITATIVE: CPT | Performed by: FAMILY MEDICINE

## 2024-11-18 PROCEDURE — 82570 ASSAY OF URINE CREATININE: CPT | Performed by: FAMILY MEDICINE

## 2024-11-18 PROCEDURE — 85025 COMPLETE CBC W/AUTO DIFF WBC: CPT | Performed by: FAMILY MEDICINE

## 2024-11-18 PROCEDURE — 83550 IRON BINDING TEST: CPT | Performed by: FAMILY MEDICINE

## 2024-11-18 PROCEDURE — 80053 COMPREHEN METABOLIC PANEL: CPT | Performed by: FAMILY MEDICINE

## 2024-11-18 PROCEDURE — 90662 IIV NO PRSV INCREASED AG IM: CPT | Performed by: FAMILY MEDICINE

## 2024-11-18 SDOH — HEALTH STABILITY: PHYSICAL HEALTH: ON AVERAGE, HOW MANY DAYS PER WEEK DO YOU ENGAGE IN MODERATE TO STRENUOUS EXERCISE (LIKE A BRISK WALK)?: 6 DAYS

## 2024-11-18 ASSESSMENT — SOCIAL DETERMINANTS OF HEALTH (SDOH): HOW OFTEN DO YOU GET TOGETHER WITH FRIENDS OR RELATIVES?: ONCE A WEEK

## 2024-11-18 NOTE — PROGRESS NOTES
Preventive Care Visit  [facility]  RADHA LOFTON MD, Family Medicine  Nov 18, 2024  {Provider  Link to [facility] :056381}    {PROVIDER CHARTING PREFERENCE:980494}    Mark Zendejas is a 64 year old, presenting for the following:  Wellness Visit        11/18/2024     9:33 AM   Additional Questions   Roomed by Chaz          Retired in July 2024  Exercising -   Was averaging 12,000 steps/day for 3 months, now goal is > 10,000   Keeping busy with grandkids - between ages 7 and 12 -all live close by    Thinks she has left foot - plantar fasciitis  Stopped running on the [employer] - doing more walking         ***  {MA/LPN/RN Pre-Provider Visit Orders- hCG/UA/Strep (Optional):731767}  {SUPERLIST (Optional):806013}  {additonal problems for provider to add (Optional):335454}  Health Care Directive  Patient does not have a Health Care Directive: Discussed advance care planning with patient; information given to patient to review.      11/18/2024   General Health   How would you rate your overall physical health? Good   Feel stress (tense, anxious, or unable to sleep) Only a little      (!) STRESS CONCERN      11/18/2024   Nutrition   Diet: Regular (no restrictions)            11/18/2024   Exercise   Days per week of moderate/strenous exercise 6 days            11/18/2024   Social Factors   Frequency of gathering with friends or relatives Once a week   Worry food won't last until get money to buy more No   Food not last or not have enough money for food? No   Do you have housing? (Housing is defined as stable permanent housing and does not include staying ouside in a car, in a tent, in an abandoned building, in an overnight shelter, or couch-surfing.) Yes   Are you worried about losing your housing? No   Lack of transportation? No   Unable to get utilities (heat,electricity)? No            11/18/2024   Fall Risk   Fallen 2 or more times in the past year? No    Trouble with  walking or balance? No        Patient-reported          11/18/2024   Activities of Daily Living- Home Safety   Needs help with the following daily activites None of the above   Safety concerns in the home None of the above            11/18/2024   Dental   Dentist two times every year? Yes            11/18/2024   Hearing Screening   Hearing concerns? (!) I NEED TO ASK PEOPLE TO SPEAK UP OR REPEAT THEMSELVES.            11/18/2024   Driving Risk Screening   Patient/family members have concerns about driving No            11/18/2024   General Alertness/Fatigue Screening   Have you been more tired than usual lately? No            11/18/2024   Urinary Incontinence Screening   Bothered by leaking urine in past 6 months No            11/18/2024   TB Screening   Were you born outside of the [place]? No            Today's PHQ-2 Score:       11/18/2024     9:23 AM   PHQ-2 ( 1999 Pf[employer])   Q1: Little interest or pleasure in doing things 0    Q2: Feeling down, depressed or hopeless 0    PHQ-2 Score 0    Q1: Little interest or pleasure in doing things Not at all   Q2: Feeling down, depressed or hopeless Not at all   PHQ-2 Score 0       Patient-reported           11/18/2024   Substance Use   Alcohol more than 3/day or more than 7/wk No   Do you have a current opioid prescription? No   How severe/bad is pain from 1 to 10? 1/10   Do you use any other substances recreationally? No        Social History     Tobacco Use    Smoking status: Never    Smokeless tobacco: Never   Substance Use Topics    Alcohol use: No    Drug use: No     {Provider  If there are gaps in the social history shown above, please follow the link to update and then refresh the note Link to Social and Substance History :163642}      11/8/2023   LAST FHS-7 RESULTS   1st degree relative breast or ovarian cancer No    Any relative bilateral breast cancer No    Any male have breast cancer No    Any ONE woman have BOTH breast AND ovarian cancer No    Any woman with breast  cancer before 50yrs No    2 or more relatives with breast AND/OR ovarian cancer No    2 or more relatives with breast AND/OR bowel cancer No        Patient-reported     {If any of the questions to the FHS7 are answered yes, consider referral for genetic counseling.    Additional indications for genetic referral include personal history of breast or ovarian cancer, genetic mutation in 1st degree relative which increases risk of breast cancer including BRCA1, BRCA2, GIANNA, PALB 2, TP53, CHEK2, PTEN, CDH1, STK11 (per ACS) and/or 1st degree relative with history of pancreatic or high-risk prostate cancer (per NCCN):106310}   {Mammogram Decision Support (Optional):666584}      History of abnormal Pap smear: { :611438}        Latest Ref Rng & Units 11/15/2023     8:40 AM 2/20/2019     8:35 AM   PAP / HPV   PAP  Negative for Intraepithelial Lesion or Malignancy (NILM)  Negative for squamous intraepithelial lesion or malignancy  Electronically signed by Doreen Hall CT (ASCP) on 2/27/2019 at 10:29 AM      HPV 16 DNA Negative Negative  Negative    HPV 18 DNA Negative Negative  Negative    Other HR HPV Negative Negative  Negative      ASCVD Risk   The 10-year ASCVD risk score (Shruthi DK, et al., 2019) is: 5.6%    Values used to calculate the score:      Age: 64 years      Sex: Female      Is Non- : No      Diabetic: No      Tobacco smoker: No      Systolic Blood Pressure: 118 mmHg      Is BP treated: No      HDL Cholesterol: 50 mg/dL      Total Cholesterol: 249 mg/dL    {Link to Fracture Risk Assessment Tool (Optional):894474}    {Provider  REQUIRED FOR AWV Use the storyboard to review patient history, after sections have been marked as reviewed, refresh note to capture documentation:699995}  {Provider   REQUIRED AWV use this link to review and update sexual activity history  after section has been marked as reviewed, refresh note to capture documentation:440249}  Reviewed and updated as  "needed this visit by Provider                    {HISTORY OPTIONS (Optional):723309}  Current providers sharing in care for this patient include:  Patient Care Team:  Gema Prajapati MD as PCP - General (Family Practice)  Gema Prajapati MD as Assigned PCP    The following health maintenance items are reviewed in Epic and correct as of today:  Health Maintenance   Topic Date Due    DEXA  Never done    INFLUENZA VACCINE (1) 09/01/2024    COVID-19 Vaccine (4 - 2024-25 season) 09/01/2024    BMP  11/15/2024    LIPID  11/15/2024    MICROALBUMIN  11/15/2024    ANNUAL REVIEW OF HM ORDERS  11/15/2024    Pneumococcal Vaccine: 65+ Years (1 of 1 - PCV) 06/12/2024    MEDICARE ANNUAL WELLNESS VISIT  11/15/2024    HEMOGLOBIN  11/15/2024    FALL RISK ASSESSMENT  11/18/2025    MAMMO SCREENING  11/28/2025    COLORECTAL CANCER SCREENING  05/13/2026    GLUCOSE  11/15/2026    DTAP/TDAP/TD IMMUNIZATION (3 - Td or Tdap) 10/26/2028    ADVANCE CARE PLANNING  11/18/2029    RSV VACCINE (1 - 1-dose 75+ series) 06/12/2034    HEPATITIS C SCREENING  Completed    HIV SCREENING  Completed    PHQ-2 (once per calendar year)  Completed    URINALYSIS  Completed    ZOSTER IMMUNIZATION  Completed    HPV IMMUNIZATION  Aged Out    MENINGITIS IMMUNIZATION  Aged Out    RSV MONOCLONAL ANTIBODY  Aged Out    PAP  Discontinued       {ROS Picklists (Optional):362755}     Objective    Exam  /79 (BP Location: Right arm, Patient Position: Sitting, Cuff Size: Adult Large)   Pulse 63   Temp 96.8  F (36  C) (Temporal)   Ht 1.549 m (5' 0.98\")   Wt 76.2 kg (168 lb)   LMP  (LMP Unknown)   SpO2 97%   BMI 31.76 kg/m     Estimated body mass index is 31.76 kg/m  as calculated from the following:    Height as of this encounter: 1.549 m (5' 0.98\").    Weight as of this encounter: 76.2 kg (168 lb).    Physical Exam  {Exam Choices (Optional):714201}        11/18/2024   Mini Cog   Clock Draw Score 2 Normal   3 Item Recall 3 objects " recalled   Mini Cog Total Score 5        {A Mini-Cog total score of 0-2 suggests the possibility of dementia, score of 3-5 suggests no dementia:600484}    Vision Screen  Vision Screen Results: (!) REFER  No Corrective Lenses, PLUS LENS REQUIRED: Pass  {Provider  Link to Vision and Hearing Results :945330}    Signed Electronically by: RADHA LOFTON MD  {Email feedback regarding this note to [email or web address]   :262345}Prior to immunization administration, verified patients identity using patient s name and date of birth. Please see Immunization Activity for additional information.     Screening Questionnaire for Adult Immunization    Are you sick today?   No   Do you have allergies to medications, food, a vaccine component or latex?   No   Have you ever had a serious reaction after receiving a vaccination?   No   Do you have a long-term health problem with heart, lung, kidney, or metabolic disease (e.g., diabetes), asthma, a blood disorder, no spleen, complement component deficiency, a cochlear implant, or a spinal fluid leak?  Are you on long-term aspirin therapy?   No   Do you have cancer, leukemia, HIV/AIDS, or any other immune system problem?   No   Do you have a parent, brother, or sister with an immune system problem?   No   In the past 3 months, have you taken medications that affect  your immune system, such as prednisone, other steroids, or anticancer drugs; drugs for the treatment of rheumatoid arthritis, Crohn s disease, or psoriasis; or have you had radiation treatments?   No   Have you had a seizure, or a brain or other nervous system problem?   No   During the past year, have you received a transfusion of blood or blood    products, or been given immune (gamma) globulin or antiviral drug?   No   For women: Are you pregnant or is there a chance you could become       pregnant during the next month?   No   Have you received any vaccinations in the past 4  weeks?   No     Immunization questionnaire answers were all negative.      Patient instructed to remain in clinic for 15 minutes afterwards, and to report any adverse reactions.     Screening performed by Chaz Brothers on 11/18/2024 at 9:42 AM.    "  Cardiovascular:  Negative for chest pain and palpitations.   Gastrointestinal:  Negative for abdominal pain and vomiting.   Genitourinary:  Negative for dysuria and hematuria.   Musculoskeletal:  Negative for arthralgias and back pain.   Skin:  Negative for color change and rash.   Neurological:  Negative for seizures and syncope.   All other systems reviewed and are negative.         Objective    Exam  /79 (BP Location: Right arm, Patient Position: Sitting, Cuff Size: Adult Large)   Pulse 63   Temp 96.8  F (36  C) (Temporal)   Ht 1.549 m (5' 0.98\")   Wt 76.2 kg (168 lb)   LMP  (LMP Unknown)   SpO2 97%   BMI 31.76 kg/m     Estimated body mass index is 31.76 kg/m  as calculated from the following:    Height as of this encounter: 1.549 m (5' 0.98\").    Weight as of this encounter: 76.2 kg (168 lb).    Physical Exam  Vitals reviewed.   Constitutional:       General: She is not in acute distress.     Appearance: Normal appearance.   HENT:      Head: Normocephalic.      Right Ear: Tympanic membrane, ear canal and external ear normal.      Left Ear: Tympanic membrane, ear canal and external ear normal.      Nose: Nose normal.      Mouth/Throat:      Mouth: Mucous membranes are moist.      Pharynx: No posterior oropharyngeal erythema.   Eyes:      Extraocular Movements: Extraocular movements intact.      Conjunctiva/sclera: Conjunctivae normal.      Pupils: Pupils are equal, round, and reactive to light.   Cardiovascular:      Rate and Rhythm: Normal rate and regular rhythm.      Pulses: Normal pulses.      Heart sounds: Normal heart sounds. No murmur heard.  Pulmonary:      Effort: Pulmonary effort is normal.      Breath sounds: Normal breath sounds.   Abdominal:      Palpations: Abdomen is soft. There is no mass.      Tenderness: There is no abdominal tenderness. There is no guarding or rebound.   Musculoskeletal:         General: No deformity. Normal range of motion.      Cervical back: Normal range of " motion and neck supple.   Lymphadenopathy:      Cervical: No cervical adenopathy.   Skin:     General: Skin is warm and dry.   Neurological:      General: No focal deficit present.      Mental Status: She is alert.   Psychiatric:         Mood and Affect: Mood normal.         Behavior: Behavior normal.       Results for orders placed or performed in visit on 11/18/24   Lipid panel reflex to direct LDL Non-fasting     Status: Abnormal   Result Value Ref Range    Cholesterol 231 (H) <200 mg/dL    Triglycerides 182 (H) <150 mg/dL    Direct Measure HDL 46 (L) >=50 mg/dL    LDL Cholesterol Calculated 149 (H) <100 mg/dL    Non HDL Cholesterol 185 (H) <130 mg/dL    Patient Fasting > 8hrs? Yes     Narrative    Cholesterol  Desirable: < 200 mg/dL  Borderline High: 200 - 239 mg/dL  High: >= 240 mg/dL    Triglycerides  Normal: < 150 mg/dL  Borderline High: 150 - 199 mg/dL  High: 200-499 mg/dL  Very High: >= 500 mg/dL    Direct Measure HDL  Female: >= 50 mg/dL   Male: >= 40 mg/dL    LDL Cholesterol  Desirable: < 100 mg/dL  Above Desirable: 100 - 129 mg/dL   Borderline High: 130 - 159 mg/dL   High:  160 - 189 mg/dL   Very High: >= 190 mg/dL    Non HDL Cholesterol  Desirable: < 130 mg/dL  Above Desirable: 130 - 159 mg/dL  Borderline High: 160 - 189 mg/dL  High: 190 - 219 mg/dL  Very High: >= 220 mg/dL   Comprehensive metabolic panel (BMP + Alb, Alk Phos, ALT, AST, Total. Bili, TP)     Status: Abnormal   Result Value Ref Range    Sodium 142 135 - 145 mmol/L    Potassium 4.0 3.4 - 5.3 mmol/L    Carbon Dioxide (CO2) 26 22 - 29 mmol/L    Anion Gap 11 7 - 15 mmol/L    Urea Nitrogen 11.3 8.0 - 23.0 mg/dL    Creatinine 1.08 (H) 0.51 - 0.95 mg/dL    GFR Estimate 57 (L) >60 mL/min/1.73m2    Calcium 9.6 8.8 - 10.4 mg/dL    Chloride 105 98 - 107 mmol/L    Glucose 90 70 - 99 mg/dL    Alkaline Phosphatase 93 40 - 150 U/L    AST 30 0 - 45 U/L    ALT 23 0 - 50 U/L    Protein Total 7.8 6.4 - 8.3 g/dL    Albumin 4.3 3.5 - 5.2 g/dL    Bilirubin  Total 0.8 <=1.2 mg/dL    Patient Fasting > 8hrs? Yes    Iron and iron binding capacity     Status: Normal   Result Value Ref Range    Iron 80 37 - 145 ug/dL    Iron Binding Capacity 273 240 - 430 ug/dL    Iron Sat Index 29 15 - 46 %   Ferritin     Status: Normal   Result Value Ref Range    Ferritin 96 11 - 328 ng/mL   CBC with platelets and differential     Status: None   Result Value Ref Range    WBC Count 4.7 4.0 - 11.0 10e3/uL    RBC Count 4.63 3.80 - 5.20 10e6/uL    Hemoglobin 13.7 11.7 - 15.7 g/dL    Hematocrit 40.5 35.0 - 47.0 %    MCV 88 78 - 100 fL    MCH 29.6 26.5 - 33.0 pg    MCHC 33.8 31.5 - 36.5 g/dL    RDW 12.1 10.0 - 15.0 %    Platelet Count 217 150 - 450 10e3/uL    % Neutrophils 46 %    % Lymphocytes 38 %    % Monocytes 9 %    % Eosinophils 5 %    % Basophils 1 %    % Immature Granulocytes 0 %    Absolute Neutrophils 2.2 1.6 - 8.3 10e3/uL    Absolute Lymphocytes 1.8 0.8 - 5.3 10e3/uL    Absolute Monocytes 0.4 0.0 - 1.3 10e3/uL    Absolute Eosinophils 0.2 0.0 - 0.7 10e3/uL    Absolute Basophils 0.1 0.0 - 0.2 10e3/uL    Absolute Immature Granulocytes 0.0 <=0.4 10e3/uL   Albumin Random Urine Quantitative with Creat Ratio     Status: None   Result Value Ref Range    Creatinine Urine mg/dL 149.0 mg/dL    Albumin Urine mg/L <12.0 mg/L    Albumin Urine mg/g Cr     CBC with Platelets & Differential     Status: None    Narrative    The following orders were created for panel order CBC with Platelets & Differential.  Procedure                               Abnormality         Status                     ---------                               -----------         ------                     CBC with platelets and d...[[id number]]                      Final result                 Please view results for these tests on the individual orders.               11/18/2024   Mini Cog   Clock Draw Score 2 Normal   3 Item Recall 3 objects recalled   Mini Cog Total Score 5            Vision Screen  Vision Screen Results: (!)  REFER  No Corrective Lenses, PLUS LENS REQUIRED: Pass      Signed Electronically by: RADHA LOFTON MD  Prior to immunization administration, verified patients identity using patient s name and date of birth. Please see Immunization Activity for additional information.     Screening Questionnaire for Adult Immunization    Are you sick today?   No   Do you have allergies to medications, food, a vaccine component or latex?   No   Have you ever had a serious reaction after receiving a vaccination?   No   Do you have a long-term health problem with heart, lung, kidney, or metabolic disease (e.g., diabetes), asthma, a blood disorder, no spleen, complement component deficiency, a cochlear implant, or a spinal fluid leak?  Are you on long-term aspirin therapy?   No   Do you have cancer, leukemia, HIV/AIDS, or any other immune system problem?   No   Do you have a parent, brother, or sister with an immune system problem?   No   In the past 3 months, have you taken medications that affect  your immune system, such as prednisone, other steroids, or anticancer drugs; drugs for the treatment of rheumatoid arthritis, Crohn s disease, or psoriasis; or have you had radiation treatments?   No   Have you had a seizure, or a brain or other nervous system problem?   No   During the past year, have you received a transfusion of blood or blood    products, or been given immune (gamma) globulin or antiviral drug?   No   For women: Are you pregnant or is there a chance you could become       pregnant during the next month?   No   Have you received any vaccinations in the past 4 weeks?   No     Immunization questionnaire answers were all negative.      Patient instructed to remain in clinic for 15 minutes afterwards, and to report any adverse reactions.     Screening performed by Chaz Brothers on 11/18/2024 at 9:42 AM.

## 2024-11-19 ENCOUNTER — PATIENT OUTREACH (OUTPATIENT)
Dept: CARE COORDINATION | Facility: CLINIC | Age: 64
End: 2024-11-19
Payer: COMMERCIAL

## 2024-11-21 ENCOUNTER — PATIENT OUTREACH (OUTPATIENT)
Dept: CARE COORDINATION | Facility: CLINIC | Age: 64
End: 2024-11-21
Payer: COMMERCIAL

## 2024-11-24 ASSESSMENT — ENCOUNTER SYMPTOMS
ARTHRALGIAS: 0
SHORTNESS OF BREATH: 0
ABDOMINAL PAIN: 0
FEVER: 0
DYSURIA: 0
PALPITATIONS: 0
BACK PAIN: 0
HEMATURIA: 0
VOMITING: 0
SORE THROAT: 0
CHILLS: 0
EYE PAIN: 0
COUGH: 0
COLOR CHANGE: 0
SEIZURES: 0

## 2024-11-26 ENCOUNTER — TRANSFERRED RECORDS (OUTPATIENT)
Dept: HEALTH INFORMATION MANAGEMENT | Facility: CLINIC | Age: 64
End: 2024-11-26
Payer: COMMERCIAL

## 2024-12-06 ENCOUNTER — HOSPITAL ENCOUNTER (OUTPATIENT)
Dept: BONE DENSITY | Facility: HOSPITAL | Age: 64
Discharge: HOME | End: 2024-12-06
Attending: FAMILY MEDICINE | Admitting: FAMILY MEDICINE
Payer: COMMERCIAL

## 2024-12-06 DIAGNOSIS — Z78.0 MENOPAUSE: ICD-10-CM

## 2024-12-06 PROCEDURE — 77091 TBS TECHL CALCULATION ONLY: CPT

## 2024-12-06 PROCEDURE — 77080 DXA BONE DENSITY AXIAL: CPT

## 2024-12-18 DIAGNOSIS — E78.2 MIXED HYPERLIPIDEMIA: ICD-10-CM

## 2024-12-31 NOTE — TELEPHONE ENCOUNTER
Call to patient who says she is not taking medication currently and has been off it for 3 months or longer. Patient wants to know from primary care provider if she should still be taking this, if so she will need a refill.    Last office visit - 11/18/2024 for physical where patient had lipids that were elevated.        Routing to primary care provider.

## 2025-01-01 RX ORDER — ATORVASTATIN CALCIUM 20 MG/1
20 TABLET, FILM COATED ORAL DAILY
Qty: 90 TABLET | Refills: 3 | Status: SHIPPED | OUTPATIENT
Start: 2025-01-01

## 2025-01-08 ENCOUNTER — ANCILLARY PROCEDURE (OUTPATIENT)
Dept: MAMMOGRAPHY | Facility: HOSPITAL | Age: 65
End: 2025-01-08
Attending: FAMILY MEDICINE
Payer: COMMERCIAL

## 2025-01-08 DIAGNOSIS — Z12.31 BREAST CANCER SCREENING BY MAMMOGRAM: ICD-10-CM

## 2025-01-08 PROCEDURE — 77063 BREAST TOMOSYNTHESIS BI: CPT

## 2025-01-08 PROCEDURE — 77067 SCR MAMMO BI INCL CAD: CPT
